# Patient Record
Sex: FEMALE | Race: WHITE | NOT HISPANIC OR LATINO | URBAN - METROPOLITAN AREA
[De-identification: names, ages, dates, MRNs, and addresses within clinical notes are randomized per-mention and may not be internally consistent; named-entity substitution may affect disease eponyms.]

---

## 2023-09-08 PROBLEM — E78.00 ELEVATED LDL CHOLESTEROL LEVEL: Status: ACTIVE | Noted: 2023-09-08

## 2023-09-08 PROBLEM — N95.8 GENITOURINARY SYNDROME OF MENOPAUSE: Status: ACTIVE | Noted: 2023-09-08

## 2023-09-08 PROBLEM — N81.10 FEMALE CYSTOCELE: Status: ACTIVE | Noted: 2023-09-08

## 2023-09-08 PROBLEM — I25.10 CORONARY ARTERY DISEASE INVOLVING NATIVE HEART WITHOUT ANGINA PECTORIS: Status: ACTIVE | Noted: 2023-09-08

## 2023-09-08 PROBLEM — E78.00 ELEVATED SERUM CHOLESTEROL: Status: ACTIVE | Noted: 2023-09-08

## 2023-09-08 PROBLEM — E55.9 VITAMIN D DEFICIENCY: Status: ACTIVE | Noted: 2023-09-08

## 2023-09-08 PROBLEM — R06.00 DYSPNEA: Status: ACTIVE | Noted: 2023-09-08

## 2023-09-08 PROBLEM — D75.839 THROMBOCYTHEMIA: Status: ACTIVE | Noted: 2023-09-08

## 2023-09-08 PROBLEM — R13.10 DYSPHAGIA: Status: ACTIVE | Noted: 2023-09-08

## 2023-09-08 RX ORDER — METOPROLOL TARTRATE 50 MG/1
1 TABLET ORAL 2 TIMES DAILY
COMMUNITY

## 2023-09-08 RX ORDER — ISOSORBIDE MONONITRATE 60 MG/1
1 TABLET, EXTENDED RELEASE ORAL EVERY MORNING
COMMUNITY

## 2023-09-08 RX ORDER — NITROGLYCERIN 0.4 MG/1
TABLET SUBLINGUAL
COMMUNITY

## 2023-09-08 RX ORDER — SERTRALINE HYDROCHLORIDE 100 MG/1
1 TABLET, FILM COATED ORAL DAILY
COMMUNITY
End: 2024-04-10 | Stop reason: SDUPTHER

## 2023-09-08 RX ORDER — HYDROXYZINE PAMOATE 100 MG/1
1 CAPSULE ORAL 3 TIMES DAILY PRN
COMMUNITY
End: 2024-02-07 | Stop reason: ALTCHOICE

## 2023-09-08 RX ORDER — TIOTROPIUM BROMIDE AND OLODATEROL 3.124; 2.736 UG/1; UG/1
2 SPRAY, METERED RESPIRATORY (INHALATION) DAILY
COMMUNITY
End: 2024-02-07 | Stop reason: ALTCHOICE

## 2023-09-08 RX ORDER — QUETIAPINE FUMARATE 300 MG/1
1 TABLET, FILM COATED ORAL NIGHTLY
COMMUNITY
End: 2024-04-09 | Stop reason: SDUPTHER

## 2023-09-08 RX ORDER — ALBUTEROL SULFATE 90 UG/1
1 AEROSOL, METERED RESPIRATORY (INHALATION) EVERY 4 HOURS PRN
COMMUNITY
Start: 2021-09-28 | End: 2024-02-07 | Stop reason: ALTCHOICE

## 2023-09-08 RX ORDER — ATORVASTATIN CALCIUM 80 MG/1
1 TABLET, FILM COATED ORAL DAILY
COMMUNITY

## 2023-09-08 RX ORDER — IBANDRONATE SODIUM 150 MG/1
1 TABLET, FILM COATED ORAL
COMMUNITY
Start: 2021-12-28 | End: 2024-02-07 | Stop reason: ALTCHOICE

## 2024-02-07 ENCOUNTER — HOSPITAL ENCOUNTER (OUTPATIENT)
Dept: RADIOLOGY | Facility: HOSPITAL | Age: 69
Discharge: HOME | End: 2024-02-07
Payer: MEDICARE

## 2024-02-07 ENCOUNTER — OFFICE VISIT (OUTPATIENT)
Dept: PRIMARY CARE | Facility: CLINIC | Age: 69
End: 2024-02-07
Payer: MEDICARE

## 2024-02-07 ENCOUNTER — HOSPITAL ENCOUNTER (OUTPATIENT)
Dept: RADIOLOGY | Facility: HOSPITAL | Age: 69
Discharge: HOME | End: 2024-02-07

## 2024-02-07 VITALS
HEART RATE: 73 BPM | DIASTOLIC BLOOD PRESSURE: 78 MMHG | HEIGHT: 62 IN | RESPIRATION RATE: 16 BRPM | OXYGEN SATURATION: 98 % | SYSTOLIC BLOOD PRESSURE: 118 MMHG | WEIGHT: 202.2 LBS | BODY MASS INDEX: 37.21 KG/M2

## 2024-02-07 DIAGNOSIS — G47.33 OSA (OBSTRUCTIVE SLEEP APNEA): ICD-10-CM

## 2024-02-07 DIAGNOSIS — G89.29 CHRONIC PAIN OF BOTH SHOULDERS: ICD-10-CM

## 2024-02-07 DIAGNOSIS — N32.81 OVERACTIVE BLADDER: ICD-10-CM

## 2024-02-07 DIAGNOSIS — M25.511 CHRONIC PAIN OF BOTH SHOULDERS: ICD-10-CM

## 2024-02-07 DIAGNOSIS — Z00.00 MEDICARE ANNUAL WELLNESS VISIT, SUBSEQUENT: ICD-10-CM

## 2024-02-07 DIAGNOSIS — Z13.1 SCREENING FOR DIABETES MELLITUS (DM): ICD-10-CM

## 2024-02-07 DIAGNOSIS — F51.01 PRIMARY INSOMNIA: ICD-10-CM

## 2024-02-07 DIAGNOSIS — Z13.29 SCREENING FOR THYROID DISORDER: ICD-10-CM

## 2024-02-07 DIAGNOSIS — E78.2 MIXED HYPERLIPIDEMIA: ICD-10-CM

## 2024-02-07 DIAGNOSIS — E55.9 VITAMIN D DEFICIENCY: ICD-10-CM

## 2024-02-07 DIAGNOSIS — M25.512 CHRONIC PAIN OF BOTH SHOULDERS: ICD-10-CM

## 2024-02-07 DIAGNOSIS — J45.20 MILD INTERMITTENT ASTHMA WITHOUT COMPLICATION (HHS-HCC): Primary | ICD-10-CM

## 2024-02-07 DIAGNOSIS — M81.0 AGE-RELATED OSTEOPOROSIS WITHOUT CURRENT PATHOLOGICAL FRACTURE: ICD-10-CM

## 2024-02-07 PROBLEM — J41.0 SIMPLE CHRONIC BRONCHITIS (MULTI): Status: ACTIVE | Noted: 2024-02-07

## 2024-02-07 PROCEDURE — 73030 X-RAY EXAM OF SHOULDER: CPT | Mod: RT

## 2024-02-07 PROCEDURE — 99215 OFFICE O/P EST HI 40 MIN: CPT

## 2024-02-07 PROCEDURE — 1126F AMNT PAIN NOTED NONE PRSNT: CPT

## 2024-02-07 PROCEDURE — G0439 PPPS, SUBSEQ VISIT: HCPCS

## 2024-02-07 PROCEDURE — 1170F FXNL STATUS ASSESSED: CPT

## 2024-02-07 PROCEDURE — 93005 ELECTROCARDIOGRAM TRACING: CPT

## 2024-02-07 PROCEDURE — 93010 ELECTROCARDIOGRAM REPORT: CPT

## 2024-02-07 PROCEDURE — 1036F TOBACCO NON-USER: CPT

## 2024-02-07 PROCEDURE — 1159F MED LIST DOCD IN RCRD: CPT

## 2024-02-07 PROCEDURE — 73030 X-RAY EXAM OF SHOULDER: CPT | Mod: LT

## 2024-02-07 ASSESSMENT — ACTIVITIES OF DAILY LIVING (ADL)
PILL BOX USED: NO
MANAGING FINANCES: INDEPENDENT
TOILETING: INDEPENDENT
USING TELEPHONE: INDEPENDENT
TAKING MEDICATION: INDEPENDENT
WALKS IN HOME: INDEPENDENT
NEEDS ASSISTANCE WITH FOOD: INDEPENDENT
STIL DRIVING: YES
JUDGMENT_ADEQUATE_SAFELY_COMPLETE_DAILY_ACTIVITIES: YES
GROCERY SHOPPING: INDEPENDENT
DRESSING YOURSELF: INDEPENDENT
PREPARING MEALS: INDEPENDENT
ADEQUATE_TO_COMPLETE_ADL: YES
USING TRANSPORTATION: INDEPENDENT
FEEDING YOURSELF: INDEPENDENT
HEARING - LEFT EAR: FUNCTIONAL
GROOMING: INDEPENDENT
DOING HOUSEWORK: INDEPENDENT
HEARING - RIGHT EAR: FUNCTIONAL
EATING: INDEPENDENT
BATHING: INDEPENDENT
PATIENT'S MEMORY ADEQUATE TO SAFELY COMPLETE DAILY ACTIVITIES?: YES

## 2024-02-07 ASSESSMENT — COLUMBIA-SUICIDE SEVERITY RATING SCALE - C-SSRS
6. HAVE YOU EVER DONE ANYTHING, STARTED TO DO ANYTHING, OR PREPARED TO DO ANYTHING TO END YOUR LIFE?: NO
2. HAVE YOU ACTUALLY HAD ANY THOUGHTS OF KILLING YOURSELF?: NO
1. IN THE PAST MONTH, HAVE YOU WISHED YOU WERE DEAD OR WISHED YOU COULD GO TO SLEEP AND NOT WAKE UP?: NO

## 2024-02-07 ASSESSMENT — ENCOUNTER SYMPTOMS
DEPRESSION: 0
LOSS OF SENSATION IN FEET: 0
OCCASIONAL FEELINGS OF UNSTEADINESS: 0

## 2024-02-07 ASSESSMENT — PATIENT HEALTH QUESTIONNAIRE - PHQ9
SUM OF ALL RESPONSES TO PHQ9 QUESTIONS 1 AND 2: 0
1. LITTLE INTEREST OR PLEASURE IN DOING THINGS: NOT AT ALL
2. FEELING DOWN, DEPRESSED OR HOPELESS: NOT AT ALL

## 2024-02-07 ASSESSMENT — PAIN SCALES - GENERAL: PAINLEVEL: 0-NO PAIN

## 2024-02-07 NOTE — PATIENT INSTRUCTIONS
I have ordered lab work for you to get done. This should be fasting. Nothing to eat or drink after midnight besides black tea,  black coffee, or water. If you do not hear from this office within two days of having your labs done, please call for your results.   I have ordered a bone density test to be done when you can.  We will call the results.  Decrease intake of saturated fats, fast food, sweets.  Increase intake of fresh fruit fresh vegetables and lean meats.  Increase healthy fats seeds, nuts, olive oil instead of butter.  walk 150 minutes/week for heart health.

## 2024-02-07 NOTE — PROGRESS NOTES
"Subjective   Patient ID: Marilou Albarado is a 68 y.o. female who presents for Annual Exam.    HPI   patient is here for annual physical former [facility] patient.  Denies any issues with chest pain, chest pressure, shortness of breath, constipation, diarrhea, blood in stool, blood in urine, muscle weakness in arms and legs, numbness and tingling in fingers or toes.  She does not monitor her blood pressure at home she does have bilateral shoulder pain that she has been had for over 2 years she takes Tylenol 2 tablets once a day but she continues to have increasing pain that she cannot tolerate anymore.  She does see Dr. Chirinos every 3 months she saw him 2 weeks ago.  She does have overactive bladder and experiences urinary urgency, frequency.  Review of Systems  Review of Systems negative except as noted in HPI and Chief complaint.    Current Outpatient Medications:     atorvastatin (Lipitor) 80 mg tablet, Take 1 tablet (80 mg) by mouth once daily., Disp: , Rfl:     isosorbide mononitrate ER (Imdur) 60 mg 24 hr tablet, Take 1 tablet (60 mg) by mouth once daily in the morning., Disp: , Rfl:     metoprolol tartrate (Lopressor) 50 mg tablet, Take 1 tablet by mouth 2 times a day., Disp: , Rfl:     nitroglycerin (Nitrostat) 0.4 mg SL tablet, as directed Sublingual prn, Disp: , Rfl:     QUEtiapine (SEROquel) 300 mg tablet, Take 1 tablet (300 mg) by mouth once daily at bedtime., Disp: , Rfl:     sertraline (Zoloft) 100 mg tablet, Take 1 tablet (100 mg) by mouth once daily., Disp: , Rfl:     Objective   /78 (BP Location: Left arm, Patient Position: Sitting, BP Cuff Size: Adult)   Pulse 73   Resp 16   Ht 1.575 m (5' 2\")   Wt 91.7 kg (202 lb 3.2 oz)   SpO2 98%   BMI 36.98 kg/m²     Physical Exam  Vitals reviewed.   Constitutional:       General: She is not in acute distress.     Appearance: Normal appearance.   HENT:      Head: Normocephalic.      Right Ear: Tympanic membrane normal.      Left Ear: Tympanic membrane normal. "      Nose: Nose normal.      Mouth/Throat:      Mouth: Mucous membranes are moist.      Pharynx: Oropharynx is clear.   Eyes:      Extraocular Movements: Extraocular movements intact.      Conjunctiva/sclera: Conjunctivae normal.      Pupils: Pupils are equal, round, and reactive to light.   Cardiovascular:      Rate and Rhythm: Normal rate.      Pulses: Normal pulses.      Heart sounds: Normal heart sounds.   Pulmonary:      Effort: Pulmonary effort is normal.      Breath sounds: Normal breath sounds.   Abdominal:      General: Abdomen is flat. Bowel sounds are normal.      Palpations: Abdomen is soft.   Musculoskeletal:         General: Normal range of motion.   Skin:     General: Skin is warm and dry.      Capillary Refill: Capillary refill takes 2 to 3 seconds.   Neurological:      General: No focal deficit present.      Mental Status: She is alert and oriented to person, place, and time. Mental status is at baseline.   Psychiatric:         Mood and Affect: Mood normal.         Behavior: Behavior is cooperative.     Assessment/Plan   Diagnoses and all orders for this visit:  Mild intermittent asthma without complication  Primary insomnia  Mixed hyperlipidemia  -     Comprehensive Metabolic Panel; Future  -     CBC and Auto Differential; Future  -     Lipid Panel; Future  -     ECG 12 lead (Clinic Performed)  Age-related osteoporosis without current pathological fracture  -     XR DEXA bone density; Future  Vitamin D deficiency  -     Vitamin D 25-Hydroxy,Total (for eval of Vitamin D levels); Future  Overactive bladder  ANIVAL (obstructive sleep apnea)  Medicare annual wellness visit, subsequent  Screening for thyroid disorder  -     TSH with reflex to Free T4 if abnormal; Future  Screening for diabetes mellitus (DM)  Chronic pain of both shoulders  -     XR shoulder left 2+ views; Future  -     XR shoulder right 2+ views; Future  Other orders  -     Follow Up In Primary Care - Health Maintenance; Future    I have  ordered lab work for you to get done. This should be fasting. Nothing to eat or drink after midnight besides black tea,  black coffee, or water. If you do not hear from this office within two days of having your labs done, please call for your results.   I have ordered a bone density test to be done when you can.  We will call the results.  Decrease intake of saturated fats, fast food, sweets.  Increase intake of fresh fruit fresh vegetables and lean meats.  Increase healthy fats seeds, nuts, olive oil instead of butter.  walk 150 minutes/week for heart health.  Advanced care planning was discussed with Marilou Albarado today. We reviewed code status, Medical Power of , and Living will. Pt does not have LW oe POA  *This note was dictated using DRAGON speech recognition software and was corrected for spelling or grammatical errors, but despite proofreading several typographical errors might be present that might affect the meaning of the content.*  Jenna Nieto, CNP

## 2024-02-27 ENCOUNTER — HOSPITAL ENCOUNTER (OUTPATIENT)
Dept: RADIOLOGY | Facility: HOSPITAL | Age: 69
Discharge: HOME | End: 2024-02-27
Payer: MEDICARE

## 2024-02-27 ENCOUNTER — APPOINTMENT (OUTPATIENT)
Dept: RADIOLOGY | Facility: HOSPITAL | Age: 69
End: 2024-02-27
Payer: MEDICARE

## 2024-02-27 DIAGNOSIS — R91.8 OTHER NONSPECIFIC ABNORMAL FINDING OF LUNG FIELD: ICD-10-CM

## 2024-02-27 PROCEDURE — 71250 CT THORAX DX C-: CPT

## 2024-03-13 ENCOUNTER — HOSPITAL ENCOUNTER (OUTPATIENT)
Dept: RADIOLOGY | Facility: HOSPITAL | Age: 69
Discharge: HOME | End: 2024-03-13
Payer: MEDICARE

## 2024-03-13 DIAGNOSIS — M81.0 AGE-RELATED OSTEOPOROSIS WITHOUT CURRENT PATHOLOGICAL FRACTURE: ICD-10-CM

## 2024-03-13 PROCEDURE — 77080 DXA BONE DENSITY AXIAL: CPT

## 2024-03-13 NOTE — LETTER
March 18, 2024     Marilou SARMAD Albarado  [address]  [facility] [address]      Dear Ms. Albarado:    Below are the results from your recent visit:    Resulted Orders   XR DEXA bone density    Narrative    Interpreted By:  Chica Zhang,   STUDY:  DEXA BONE DENSITY3/13/2024 10:16 am      INDICATION:  Signs/Symptoms:bi annual screening. The patient is a 69 y/o  year old  F. Postmenopausal denying hormonal replacement therapy      COMPARISON:  12/16/2021      ACCESSION NUMBER(S):  [id number]      ORDERING CLINICIAN:  CHERYL MENDES      TECHNIQUE:  DEXA BONE DENSITY      FINDINGS:  SPINE L1-L4  Bone Mineral Density: 0.816 g/cm2  T-Score -2.1  Z-Score -0.2  Bone Mineral Density change vs baseline:  -1.8 %  Bone Mineral Density change vs previous: -1.8 %      LEFT FEMUR -TOTAL  Bone Mineral Density: 0.86 g/cm2  T-Score -0.7   Z-Score  0.6  Bone Mineral Density change vs baseline: 10.6 %  Bone Mineral Density change vs previous: 10.6 %      LEFT FEMUR -NECK  Bone Mineral Density: 0.705 g/cm2  T-Score -1.3  Z-Score 0.3  Bone Mineral Density change vs baseline: 31.1 %  Bone Mineral Density change vs previous: 31.1 %          World Health Organization (WHO) criteria for post-menopausal,   Women:  Normal:         T-score at or above -1 SD  Osteopenia:   T-score between -1 and -2.5 SD  Osteoporosis: T-score at or below -2.5 SD          10-year Fracture Risk:  Major Osteoporotic Fracture  8.1 % without prior fracture and 13%  with prior fracture Hip Fracture                        0.8 % without  prior fracture and 1.2% with prior fracture      Note:  If no FRAX score is reported, it is because:  Some T-score for Spine Total or Hip Total or Femoral Neck at or below  -2.5      This exam was performed at [facility] on a [facility] Dexa Unit.        Impression    DEXA:  According to World Health Organization criteria,  classification is low bone mass (osteopenia)      Followup recommended in  two years or sooner as clinically warranted.      All images and detailed analysis are available on the  Radiology  PACS.      MACRO:  None      Signed by: Chica Zhang 3/15/2024 8:51 AM  Dictation workstation:   [id number]     Your Bone Density shows OSTEOPENIA (thinning of your bones).  This places you at higher risk of fracture.   Recommendations:   1 - Maintain adequate Calcium + Vitamin D intake.  Normally we recommend 500 - 600 mg of Calcium + Vitamin D twice daily.   2 - Continue or start regular weight bearing type activities.   3 - Avoid tobacco and alcohol use.   4 - Maintain healthy lifestyle to include well rounded diet.   5 - Repeat Bone Density in 2 years.     If you have any questions or concerns, please don't hesitate to call.         Sincerely,        Jenna Nieto, CNP

## 2024-03-22 ENCOUNTER — HOSPITAL ENCOUNTER (OUTPATIENT)
Dept: RADIOLOGY | Facility: HOSPITAL | Age: 69
Discharge: HOME | End: 2024-03-22
Payer: MEDICARE

## 2024-03-22 DIAGNOSIS — R93.89 ABNORMAL FINDINGS ON DIAGNOSTIC IMAGING OF OTHER SPECIFIED BODY STRUCTURES: ICD-10-CM

## 2024-03-22 PROCEDURE — 71250 CT THORAX DX C-: CPT | Performed by: RADIOLOGY

## 2024-03-22 PROCEDURE — 71250 CT THORAX DX C-: CPT

## 2024-04-09 DIAGNOSIS — J45.20 MILD INTERMITTENT ASTHMA WITHOUT COMPLICATION (HHS-HCC): Primary | ICD-10-CM

## 2024-04-09 DIAGNOSIS — F51.01 PRIMARY INSOMNIA: ICD-10-CM

## 2024-04-09 RX ORDER — QUETIAPINE FUMARATE 300 MG/1
300 TABLET, FILM COATED ORAL NIGHTLY
Qty: 90 TABLET | Refills: 1 | Status: SHIPPED | OUTPATIENT
Start: 2024-04-09 | End: 2024-10-06

## 2024-04-10 DIAGNOSIS — F51.01 PRIMARY INSOMNIA: ICD-10-CM

## 2024-04-10 DIAGNOSIS — F41.9 ANXIETY: Primary | ICD-10-CM

## 2024-04-10 DIAGNOSIS — Z12.31 ENCOUNTER FOR SCREENING MAMMOGRAM FOR BREAST CANCER: ICD-10-CM

## 2024-04-10 RX ORDER — SERTRALINE HYDROCHLORIDE 100 MG/1
100 TABLET, FILM COATED ORAL DAILY
Qty: 90 TABLET | Refills: 1 | Status: SHIPPED | OUTPATIENT
Start: 2024-04-10 | End: 2024-10-07

## 2024-04-10 RX ORDER — QUETIAPINE FUMARATE 300 MG/1
300 TABLET, FILM COATED ORAL NIGHTLY
Qty: 90 TABLET | Refills: 1 | Status: CANCELLED | OUTPATIENT
Start: 2024-04-10 | End: 2024-10-07

## 2024-04-29 ENCOUNTER — HOSPITAL ENCOUNTER (OUTPATIENT)
Dept: RADIOLOGY | Facility: HOSPITAL | Age: 69
Discharge: HOME | End: 2024-04-29
Payer: MEDICARE

## 2024-04-29 DIAGNOSIS — Z12.31 ENCOUNTER FOR SCREENING MAMMOGRAM FOR BREAST CANCER: ICD-10-CM

## 2024-04-29 PROCEDURE — 77067 SCR MAMMO BI INCL CAD: CPT | Performed by: RADIOLOGY

## 2024-04-29 PROCEDURE — 77067 SCR MAMMO BI INCL CAD: CPT

## 2024-04-29 PROCEDURE — 77063 BREAST TOMOSYNTHESIS BI: CPT | Performed by: RADIOLOGY

## 2024-05-10 ENCOUNTER — LAB (OUTPATIENT)
Dept: LAB | Facility: LAB | Age: 69
End: 2024-05-10
Payer: MEDICARE

## 2024-05-10 DIAGNOSIS — R73.01 ELEVATED FASTING GLUCOSE: Primary | ICD-10-CM

## 2024-05-10 DIAGNOSIS — E78.2 MIXED HYPERLIPIDEMIA: ICD-10-CM

## 2024-05-10 DIAGNOSIS — E55.9 VITAMIN D DEFICIENCY: ICD-10-CM

## 2024-05-10 DIAGNOSIS — Z13.29 SCREENING FOR THYROID DISORDER: ICD-10-CM

## 2024-05-10 LAB
25(OH)D3 SERPL-MCNC: 24 NG/ML (ref 31–100)
ALBUMIN SERPL-MCNC: 4.1 G/DL (ref 3.5–5)
ALP BLD-CCNC: 108 U/L (ref 35–125)
ALT SERPL-CCNC: 19 U/L (ref 5–40)
ANION GAP SERPL CALC-SCNC: 11 MMOL/L
AST SERPL-CCNC: 17 U/L (ref 5–40)
BASOPHILS # BLD AUTO: 0.05 X10*3/UL (ref 0–0.1)
BASOPHILS NFR BLD AUTO: 0.6 %
BILIRUB SERPL-MCNC: 0.2 MG/DL (ref 0.1–1.2)
BUN SERPL-MCNC: 15 MG/DL (ref 8–25)
CALCIUM SERPL-MCNC: 8.9 MG/DL (ref 8.5–10.4)
CHLORIDE SERPL-SCNC: 105 MMOL/L (ref 97–107)
CHOLEST SERPL-MCNC: 150 MG/DL (ref 133–200)
CHOLEST/HDLC SERPL: 3.5 {RATIO}
CO2 SERPL-SCNC: 24 MMOL/L (ref 24–31)
CREAT SERPL-MCNC: 0.9 MG/DL (ref 0.4–1.6)
EGFRCR SERPLBLD CKD-EPI 2021: 71 ML/MIN/1.73M*2
EOSINOPHIL # BLD AUTO: 0.19 X10*3/UL (ref 0–0.7)
EOSINOPHIL NFR BLD AUTO: 2.4 %
ERYTHROCYTE [DISTWIDTH] IN BLOOD BY AUTOMATED COUNT: 13.5 % (ref 11.5–14.5)
GLUCOSE SERPL-MCNC: 114 MG/DL (ref 65–99)
HCT VFR BLD AUTO: 39.1 % (ref 36–46)
HDLC SERPL-MCNC: 43 MG/DL
HGB BLD-MCNC: 12.2 G/DL (ref 12–16)
IMM GRANULOCYTES # BLD AUTO: 0.04 X10*3/UL (ref 0–0.7)
IMM GRANULOCYTES NFR BLD AUTO: 0.5 % (ref 0–0.9)
LDLC SERPL CALC-MCNC: 59 MG/DL (ref 65–130)
LYMPHOCYTES # BLD AUTO: 2.79 X10*3/UL (ref 1.2–4.8)
LYMPHOCYTES NFR BLD AUTO: 35.4 %
MCH RBC QN AUTO: 28.2 PG (ref 26–34)
MCHC RBC AUTO-ENTMCNC: 31.2 G/DL (ref 32–36)
MCV RBC AUTO: 91 FL (ref 80–100)
MONOCYTES # BLD AUTO: 0.7 X10*3/UL (ref 0.1–1)
MONOCYTES NFR BLD AUTO: 8.9 %
NEUTROPHILS # BLD AUTO: 4.11 X10*3/UL (ref 1.2–7.7)
NEUTROPHILS NFR BLD AUTO: 52.2 %
NRBC BLD-RTO: 0 /100 WBCS (ref 0–0)
PLATELET # BLD AUTO: 423 X10*3/UL (ref 150–450)
POTASSIUM SERPL-SCNC: 4.8 MMOL/L (ref 3.4–5.1)
PROT SERPL-MCNC: 6.8 G/DL (ref 5.9–7.9)
RBC # BLD AUTO: 4.32 X10*6/UL (ref 4–5.2)
SODIUM SERPL-SCNC: 140 MMOL/L (ref 133–145)
TRIGL SERPL-MCNC: 239 MG/DL (ref 40–150)
TSH SERPL DL<=0.05 MIU/L-ACNC: 1.14 MIU/L (ref 0.27–4.2)
WBC # BLD AUTO: 7.9 X10*3/UL (ref 4.4–11.3)

## 2024-05-10 PROCEDURE — 84443 ASSAY THYROID STIM HORMONE: CPT

## 2024-05-10 PROCEDURE — 80053 COMPREHEN METABOLIC PANEL: CPT

## 2024-05-10 PROCEDURE — 80061 LIPID PANEL: CPT

## 2024-05-10 PROCEDURE — 85025 COMPLETE CBC W/AUTO DIFF WBC: CPT

## 2024-05-10 PROCEDURE — 36415 COLL VENOUS BLD VENIPUNCTURE: CPT

## 2024-05-10 PROCEDURE — 82306 VITAMIN D 25 HYDROXY: CPT

## 2024-08-09 ENCOUNTER — OFFICE VISIT (OUTPATIENT)
Dept: PRIMARY CARE | Facility: CLINIC | Age: 69
End: 2024-08-09
Payer: MEDICARE

## 2024-08-09 VITALS
HEIGHT: 62 IN | DIASTOLIC BLOOD PRESSURE: 76 MMHG | OXYGEN SATURATION: 97 % | HEART RATE: 70 BPM | BODY MASS INDEX: 37.54 KG/M2 | WEIGHT: 204 LBS | RESPIRATION RATE: 16 BRPM | SYSTOLIC BLOOD PRESSURE: 123 MMHG

## 2024-08-09 DIAGNOSIS — J41.0 SIMPLE CHRONIC BRONCHITIS (MULTI): ICD-10-CM

## 2024-08-09 DIAGNOSIS — M62.838 MUSCLE SPASM: ICD-10-CM

## 2024-08-09 DIAGNOSIS — R73.01 ELEVATED FASTING GLUCOSE: ICD-10-CM

## 2024-08-09 DIAGNOSIS — F41.9 ANXIETY: ICD-10-CM

## 2024-08-09 DIAGNOSIS — F51.01 PRIMARY INSOMNIA: ICD-10-CM

## 2024-08-09 DIAGNOSIS — E78.2 MIXED HYPERLIPIDEMIA: Primary | ICD-10-CM

## 2024-08-09 DIAGNOSIS — I10 PRIMARY HYPERTENSION: ICD-10-CM

## 2024-08-09 PROCEDURE — 3008F BODY MASS INDEX DOCD: CPT

## 2024-08-09 PROCEDURE — 1124F ACP DISCUSS-NO DSCNMKR DOCD: CPT

## 2024-08-09 PROCEDURE — 99214 OFFICE O/P EST MOD 30 MIN: CPT

## 2024-08-09 PROCEDURE — 1159F MED LIST DOCD IN RCRD: CPT

## 2024-08-09 PROCEDURE — 1036F TOBACCO NON-USER: CPT

## 2024-08-09 PROCEDURE — 3074F SYST BP LT 130 MM HG: CPT

## 2024-08-09 PROCEDURE — 3078F DIAST BP <80 MM HG: CPT

## 2024-08-09 PROCEDURE — 1126F AMNT PAIN NOTED NONE PRSNT: CPT

## 2024-08-09 RX ORDER — QUETIAPINE FUMARATE 300 MG/1
300 TABLET, FILM COATED ORAL NIGHTLY
Qty: 90 TABLET | Refills: 1 | Status: SHIPPED | OUTPATIENT
Start: 2024-08-09 | End: 2025-02-05

## 2024-08-09 RX ORDER — METOPROLOL TARTRATE 50 MG/1
50 TABLET ORAL 2 TIMES DAILY
Qty: 180 TABLET | Refills: 1 | Status: SHIPPED | OUTPATIENT
Start: 2024-08-09

## 2024-08-09 RX ORDER — SERTRALINE HYDROCHLORIDE 100 MG/1
100 TABLET, FILM COATED ORAL DAILY
Qty: 90 TABLET | Refills: 1 | Status: SHIPPED | OUTPATIENT
Start: 2024-08-09 | End: 2025-02-05

## 2024-08-09 RX ORDER — ATORVASTATIN CALCIUM 80 MG/1
80 TABLET, FILM COATED ORAL DAILY
Qty: 90 TABLET | Refills: 1 | Status: SHIPPED | OUTPATIENT
Start: 2024-08-09 | End: 2025-02-05

## 2024-08-09 RX ORDER — IBANDRONATE SODIUM 150 MG/1
150 TABLET, FILM COATED ORAL
COMMUNITY

## 2024-08-09 ASSESSMENT — PATIENT HEALTH QUESTIONNAIRE - PHQ9
SUM OF ALL RESPONSES TO PHQ9 QUESTIONS 1 AND 2: 0
2. FEELING DOWN, DEPRESSED OR HOPELESS: NOT AT ALL
1. LITTLE INTEREST OR PLEASURE IN DOING THINGS: NOT AT ALL

## 2024-08-09 ASSESSMENT — ENCOUNTER SYMPTOMS
LOSS OF SENSATION IN FEET: 0
DEPRESSION: 0
OCCASIONAL FEELINGS OF UNSTEADINESS: 0

## 2024-08-09 ASSESSMENT — COLUMBIA-SUICIDE SEVERITY RATING SCALE - C-SSRS
2. HAVE YOU ACTUALLY HAD ANY THOUGHTS OF KILLING YOURSELF?: NO
1. IN THE PAST MONTH, HAVE YOU WISHED YOU WERE DEAD OR WISHED YOU COULD GO TO SLEEP AND NOT WAKE UP?: NO
6. HAVE YOU EVER DONE ANYTHING, STARTED TO DO ANYTHING, OR PREPARED TO DO ANYTHING TO END YOUR LIFE?: NO

## 2024-08-09 ASSESSMENT — PAIN SCALES - GENERAL: PAINLEVEL: 0-NO PAIN

## 2024-08-09 NOTE — PATIENT INSTRUCTIONS
Muscle spasm,   Referral placed to neuro  Xray cervical spine ordered. Will call with results once received.     HYPERLIPIDEMIA:  Decrease intake of saturated fats, fast food, sweets.  Increase intake of fresh fruit fresh vegetables and lean meats.  Increase healthy fats seeds, nuts, olive oil instead of butter.  walk 150 minutes/week for heart health.  Advanced care planning was discussed with Marilou Albarado today. We reviewed code status, Medical Power of , and Living will. Pt does not have  LW or POA.

## 2024-08-09 NOTE — PROGRESS NOTES
"Subjective   Patient ID: Marilou Albarado is a 68 y.o. female who presents for 6 Month follow up.    HPI   Patient denies any falls, urgent care, ER, hospitalization, new diagnoses, surgeries in the past year.  Denies any issues with chest pain, chest pressure, shortness of breath, constipation, diarrhea, blood in stool, urinary urgency, frequency, blood in urine, muscle weakness in arms and legs, numbness or tingling in fingers or toes.  Has complaints of muscle spasm, she believes, in her next x 2 months. Can be painful at times. Denies any falls or injuries to head or neck  Review of Systems  Review of Systems negative except as noted in HPI and Chief complaint.    Current Outpatient Medications:     ibandronate (Boniva) 150 mg tablet, Take 1 tablet (150 mg) by mouth every 30 (thirty) days. Take in morning with full glass of water on an empty stomach. No food, drink, meds, or lying down for 60 minutes after., Disp: , Rfl:     nitroglycerin (Nitrostat) 0.4 mg SL tablet, as directed Sublingual prn, Disp: , Rfl:     atorvastatin (Lipitor) 80 mg tablet, Take 1 tablet (80 mg) by mouth once daily., Disp: 90 tablet, Rfl: 1    metoprolol tartrate (Lopressor) 50 mg tablet, Take 1 tablet by mouth 2 times a day., Disp: 180 tablet, Rfl: 1    QUEtiapine (SEROquel) 300 mg tablet, Take 1 tablet (300 mg) by mouth once daily at bedtime., Disp: 90 tablet, Rfl: 1    sertraline (Zoloft) 100 mg tablet, Take 1 tablet (100 mg) by mouth once daily., Disp: 90 tablet, Rfl: 1    Objective   /76 (BP Location: Left arm, Patient Position: Sitting, BP Cuff Size: Adult)   Pulse 70   Resp 16   Ht 1.575 m (5' 2\")   Wt 92.5 kg (204 lb)   LMP  (LMP Unknown)   SpO2 97%   BMI 37.31 kg/m²     Physical Exam  Vitals reviewed.   Cardiovascular:      Rate and Rhythm: Normal rate.   Pulmonary:      Effort: Pulmonary effort is normal.   Musculoskeletal:         General: Normal range of motion.      Cervical back: Normal range of motion. "   Neurological:      General: No focal deficit present.      Mental Status: She is alert.   Psychiatric:         Mood and Affect: Mood normal.       Assessment/Plan   Diagnoses and all orders for this visit:  Mixed hyperlipidemia  -     atorvastatin (Lipitor) 80 mg tablet; Take 1 tablet (80 mg) by mouth once daily.  Elevated fasting glucose  Anxiety  -     sertraline (Zoloft) 100 mg tablet; Take 1 tablet (100 mg) by mouth once daily.  Primary insomnia  -     QUEtiapine (SEROquel) 300 mg tablet; Take 1 tablet (300 mg) by mouth once daily at bedtime.  Primary hypertension  -     metoprolol tartrate (Lopressor) 50 mg tablet; Take 1 tablet by mouth 2 times a day.  Simple chronic bronchitis (Multi)  -     Disability Placard  Muscle spasm  -     Referral to Neurology; Future  -     XR cervical spine 2-3 views; Future  Other orders  -     Follow Up In Primary Care - Health Maintenance  Muscle spasm,   Referral placed to neuro  Xray cervical spine ordered. Will call with results once received.     HYPERLIPIDEMIA:  Decrease intake of saturated fats, fast food, sweets.  Increase intake of fresh fruit fresh vegetables and lean meats.  Increase healthy fats seeds, nuts, olive oil instead of butter.  walk 150 minutes/week for heart health.  Advanced care planning was discussed with Marilou Albarado today. We reviewed code status, Medical Power of , and Living will. Pt does not have  LW or POA.     *This note was dictated using DRAGON speech recognition software and was corrected for spelling or grammatical errors, but despite proofreading several typographical errors might be present that might affect the meaning of the content.*  Jenna Nieto, CNP

## 2024-08-14 ENCOUNTER — HOSPITAL ENCOUNTER (OUTPATIENT)
Dept: RADIOLOGY | Facility: HOSPITAL | Age: 69
Discharge: HOME | End: 2024-08-14
Payer: MEDICARE

## 2024-08-14 DIAGNOSIS — R91.8 OTHER NONSPECIFIC ABNORMAL FINDING OF LUNG FIELD: ICD-10-CM

## 2024-08-14 PROCEDURE — 71250 CT THORAX DX C-: CPT

## 2024-08-19 ENCOUNTER — TELEPHONE (OUTPATIENT)
Dept: PRIMARY CARE | Facility: CLINIC | Age: 69
End: 2024-08-19
Payer: MEDICARE

## 2024-08-19 DIAGNOSIS — I10 PRIMARY HYPERTENSION: ICD-10-CM

## 2024-08-19 RX ORDER — METOPROLOL SUCCINATE 50 MG/1
1 TABLET, EXTENDED RELEASE ORAL
COMMUNITY
Start: 2024-06-07 | End: 2024-08-22 | Stop reason: SDUPTHER

## 2024-08-19 RX ORDER — METOPROLOL SUCCINATE 50 MG/1
50 TABLET, EXTENDED RELEASE ORAL
Qty: 90 TABLET | Refills: 0 | OUTPATIENT
Start: 2024-08-19 | End: 2024-11-17

## 2024-08-19 RX ORDER — METOPROLOL TARTRATE 50 MG/1
50 TABLET ORAL 2 TIMES DAILY
Qty: 180 TABLET | Refills: 1 | Status: SHIPPED | OUTPATIENT
Start: 2024-08-19 | End: 2024-08-22 | Stop reason: WASHOUT

## 2024-08-19 NOTE — TELEPHONE ENCOUNTER
Patient's pharmacy called and she told the pharmacy that her cardiologist used to prescribe the metoprolol to her but he retired. She is on metoprolol succinate ER 50 mg twice a day. Can you send in new Rx?

## 2024-08-19 NOTE — TELEPHONE ENCOUNTER
Patient called. It looks like you sent in Metoprolol (Lopressor) 50 mg once a day. She takes Metoprolol Succinate ER 50 mg once a day. Please discontinue metoprolol tartrate (Lopressor) 50 mg 2 times a day. I pulled the correct medication over from the outside medication tab. See Rx tab. Correct pharmacy selected.

## 2024-08-21 ENCOUNTER — TELEPHONE (OUTPATIENT)
Dept: CARDIOLOGY | Facility: CLINIC | Age: 69
End: 2024-08-21
Payer: MEDICARE

## 2024-08-22 DIAGNOSIS — I10 PRIMARY HYPERTENSION: ICD-10-CM

## 2024-08-22 RX ORDER — METOPROLOL SUCCINATE 50 MG/1
50 TABLET, EXTENDED RELEASE ORAL
Qty: 90 TABLET | Refills: 1 | Status: SHIPPED | OUTPATIENT
Start: 2024-08-22

## 2024-09-05 ENCOUNTER — APPOINTMENT (OUTPATIENT)
Dept: CARDIOLOGY | Facility: CLINIC | Age: 69
End: 2024-09-05
Payer: MEDICARE

## 2024-09-26 ENCOUNTER — OFFICE VISIT (OUTPATIENT)
Dept: CARDIOLOGY | Facility: CLINIC | Age: 69
End: 2024-09-26
Payer: MEDICARE

## 2024-09-26 VITALS
SYSTOLIC BLOOD PRESSURE: 131 MMHG | BODY MASS INDEX: 37.54 KG/M2 | WEIGHT: 204 LBS | HEART RATE: 67 BPM | TEMPERATURE: 98 F | DIASTOLIC BLOOD PRESSURE: 67 MMHG | OXYGEN SATURATION: 96 % | HEIGHT: 62 IN | RESPIRATION RATE: 16 BRPM

## 2024-09-26 DIAGNOSIS — E66.9 CLASS II OBESITY: ICD-10-CM

## 2024-09-26 DIAGNOSIS — I10 PRIMARY HYPERTENSION: ICD-10-CM

## 2024-09-26 DIAGNOSIS — G47.33 OSA (OBSTRUCTIVE SLEEP APNEA): ICD-10-CM

## 2024-09-26 DIAGNOSIS — I25.10 CORONARY ARTERY DISEASE INVOLVING NATIVE HEART WITHOUT ANGINA PECTORIS, UNSPECIFIED VESSEL OR LESION TYPE: Primary | ICD-10-CM

## 2024-09-26 DIAGNOSIS — E78.2 MIXED HYPERLIPIDEMIA: ICD-10-CM

## 2024-09-26 DIAGNOSIS — R06.09 DYSPNEA ON EXERTION: ICD-10-CM

## 2024-09-26 PROBLEM — E66.812 CLASS II OBESITY: Status: ACTIVE | Noted: 2024-09-26

## 2024-09-26 PROCEDURE — 3078F DIAST BP <80 MM HG: CPT | Performed by: INTERNAL MEDICINE

## 2024-09-26 PROCEDURE — 3075F SYST BP GE 130 - 139MM HG: CPT | Performed by: INTERNAL MEDICINE

## 2024-09-26 PROCEDURE — 1160F RVW MEDS BY RX/DR IN RCRD: CPT | Performed by: INTERNAL MEDICINE

## 2024-09-26 PROCEDURE — 1159F MED LIST DOCD IN RCRD: CPT | Performed by: INTERNAL MEDICINE

## 2024-09-26 PROCEDURE — 3008F BODY MASS INDEX DOCD: CPT | Performed by: INTERNAL MEDICINE

## 2024-09-26 PROCEDURE — 1126F AMNT PAIN NOTED NONE PRSNT: CPT | Performed by: INTERNAL MEDICINE

## 2024-09-26 PROCEDURE — 99204 OFFICE O/P NEW MOD 45 MIN: CPT | Performed by: INTERNAL MEDICINE

## 2024-09-26 PROCEDURE — 1036F TOBACCO NON-USER: CPT | Performed by: INTERNAL MEDICINE

## 2024-09-26 PROCEDURE — 99214 OFFICE O/P EST MOD 30 MIN: CPT | Performed by: INTERNAL MEDICINE

## 2024-09-26 RX ORDER — METOPROLOL SUCCINATE 50 MG/1
50 TABLET, EXTENDED RELEASE ORAL
Qty: 90 TABLET | Refills: 3 | Status: SHIPPED | OUTPATIENT
Start: 2024-09-26 | End: 2025-09-26

## 2024-09-26 RX ORDER — ATORVASTATIN CALCIUM 80 MG/1
80 TABLET, FILM COATED ORAL DAILY
Qty: 90 TABLET | Refills: 3 | Status: SHIPPED | OUTPATIENT
Start: 2024-09-26 | End: 2025-09-26

## 2024-09-26 RX ORDER — AZITHROMYCIN 500 MG/1
500 TABLET, FILM COATED ORAL DAILY
Qty: 5 TABLET | Refills: 0 | Status: SHIPPED | OUTPATIENT
Start: 2024-09-26 | End: 2024-10-01

## 2024-09-26 ASSESSMENT — LIFESTYLE VARIABLES
HAS A RELATIVE, FRIEND, DOCTOR, OR ANOTHER HEALTH PROFESSIONAL EXPRESSED CONCERN ABOUT YOUR DRINKING OR SUGGESTED YOU CUT DOWN: NO
HOW OFTEN DO YOU HAVE SIX OR MORE DRINKS ON ONE OCCASION: NEVER
AUDIT TOTAL SCORE: 0
HOW OFTEN DO YOU HAVE A DRINK CONTAINING ALCOHOL: NEVER
HOW MANY STANDARD DRINKS CONTAINING ALCOHOL DO YOU HAVE ON A TYPICAL DAY: PATIENT DOES NOT DRINK
AUDIT-C TOTAL SCORE: 0
HAVE YOU OR SOMEONE ELSE BEEN INJURED AS A RESULT OF YOUR DRINKING: NO
SKIP TO QUESTIONS 9-10: 1

## 2024-09-26 ASSESSMENT — ENCOUNTER SYMPTOMS
DEPRESSION: 0
LOSS OF SENSATION IN FEET: 0
OCCASIONAL FEELINGS OF UNSTEADINESS: 0

## 2024-09-26 ASSESSMENT — PATIENT HEALTH QUESTIONNAIRE - PHQ9
1. LITTLE INTEREST OR PLEASURE IN DOING THINGS: NOT AT ALL
SUM OF ALL RESPONSES TO PHQ9 QUESTIONS 1 AND 2: 0
2. FEELING DOWN, DEPRESSED OR HOPELESS: NOT AT ALL

## 2024-09-26 ASSESSMENT — PAIN SCALES - GENERAL: PAINLEVEL: 0-NO PAIN

## 2024-09-26 NOTE — PROGRESS NOTES
Subjective   Chief Complaint   Patient presents with    Follow-up     Mrs\Ms. Albarado is present for her 6 month Follow up with Dr. Sanchez. Pt is a former [facility] pt       68-year patient with a history of COPD, currently on multiple bronchodilators.  Other history significant for COPD with asthma.  Lung nodule.  History of smoking in past.  Also sleep apnea.  Has seen pulmonary eval in the past.  Patient has not seen recently nurse practitioner primary care physician with further evaluation patient currently on Lipitor, Lopressor as well as nitro tablet as needed.  Patient had a 3 months ago lipid profile done total cholesterol 150, LDL is 59 with a triglyceride is about to 39.  BMP is unremarkable as well as CBC is unremarkable.  Electrocardiogram was done about 6 months ago essentially underlying sinus rhythm low voltage QRS rate 71 with a nonspecific ST-T changes seen.  Patient had echocardiogram done about 2 years ago essentially showed normal ejection fraction with diastolic dysfunction, otherwise unremarkable study. Patient is following Dr. Boucher.  Currently on 3 L nasal cannula oxygen.  Had a negative nuclear stress test for ischemia last year which was unremarkable.  Now here for routine evaluation.  Patient, bronchitis symptoms.  Wants some Z-Andreas antibiotic.  History reviewed. No pertinent past medical history.  History reviewed. No pertinent surgical history.  Family medical history includes dementia in father.  Current Outpatient Medications   Medication Sig Dispense Refill    atorvastatin (Lipitor) 80 mg tablet Take 1 tablet (80 mg) by mouth once daily. 90 tablet 1    ibandronate (Boniva) 150 mg tablet Take 1 tablet (150 mg) by mouth every 30 (thirty) days. Take in morning with full glass of water on an empty stomach. No food, drink, meds, or lying down for 60 minutes after.      metoprolol succinate XL (Toprol-XL) 50 mg 24 hr tablet Take 1 tablet (50 mg) by mouth early in the morning.. 90 tablet 1     "nitroglycerin (Nitrostat) 0.4 mg SL tablet as directed Sublingual prn      QUEtiapine (SEROquel) 300 mg tablet Take 1 tablet (300 mg) by mouth once daily at bedtime. 90 tablet 1    sertraline (Zoloft) 100 mg tablet Take 1 tablet (100 mg) by mouth once daily. 90 tablet 1     No current facility-administered medications for this visit.      reports that she quit smoking about 3 years ago. Her smoking use included cigarettes. She has never been exposed to tobacco smoke. She has never used smokeless tobacco. She reports that she does not drink alcohol and does not use drugs.  Allergies:  Penicillin    ROS: See HPI  CONSTITUTIONAL: Chills- none. Fever- none. Weight change appropriate for age.  HEENT: Headache- Negative.  Change in vision- none.  Ear pain- none. Nasal congestion- none. Post-nasal drip-none.  Sore throat-none.  CARDIOLOGY: Chest pain- none.  Leg edema-trace.  Murmurs-soft systolic.  Palpitation- none.  RESPIRATORY: Complain on and off shortness of breath.  Currently on a 3 L nasal cannula oxygen.  GI: Abdominal pain- none.  Change in bowel habits- none.  Constipation- none.  Diarrhea- none.  Nausea- none.  Vomiting- none.  MUSCULOSKELETAL: Joint pain- none.  Muscle aches- none.  DERMATOLOGY: Rash- none.  NEUROLOGY: Dizziness- none.   Headache- none.  PSYCHIATRY: Denies any depression or anxiety     Vitals:    09/26/24 0956   BP: 131/67   Pulse: 67   Resp: 16   Temp: 36.7 °C (98 °F)   TempSrc: Core   SpO2: 96%   Weight: 92.5 kg (204 lb)   Height: 1.575 m (5' 2\")   PainSc: 0-No pain      BMI:Body mass index is 37.31 kg/m².   General Cardiology:  General Appearance: Alert, oriented and in no acute distress.  HEENT: extra ocular movements intact (EOMI), pupils equal,  round, reactive to light and accommodation (PERRLA).  Carotid Upstroke: no bruit, normal.  Jugular Venous Distention (JVD): flat.  Chest: normal.  Lungs: Diffuse rhonchi's  Heart Sounds: no S3 or S4, normal S1, S2, regular rate.  Murmur, Click, " Gallop: no systolic murmur.  Abdomen: no hepatomegaly, no masses felt, soft.  Extremities: no leg edema.  Peripheral pulses: 2 plus bilateral.  NEUROLOGY Cranial nerves II-XII grossly intact.     Patient Active Problem List   Diagnosis    Coronary artery disease involving native heart without angina pectoris    Dysphagia    Dyspnea    Elevated LDL cholesterol level    Female cystocele    Genitourinary syndrome of menopause    Elevated serum cholesterol    Thrombocythemia    Vitamin D deficiency    Mild intermittent asthma without complication (HHS-HCC)    Primary insomnia    Overactive bladder    Mixed hyperlipidemia    ANIVAL (obstructive sleep apnea)    Age-related osteoporosis without current pathological fracture    Simple chronic bronchitis (Multi)    Elevated fasting glucose    Primary hypertension    Class II obesity       Problem List Items Addressed This Visit       Coronary artery disease involving native heart without angina pectoris - Primary    Dyspnea    Mixed hyperlipidemia    ANIVAL (obstructive sleep apnea)    Primary hypertension    Class II obesity      69-year-old female patient with only history of obesity, hypertension hyperlipidemia.  1.  COPD/short of breath: Patient currently on 3 L nasal current oxygen.  Patient complain of bronchitis symptoms once antibiotic we will start patients on a Zithromax 5 mg tablet p.o. daily for 5 days.  Advised patient to follow-up with the primary care physician and pulmonologist for further treatment plans and goals.  2.  Hypertension: Continue current Toprol-XL 50 mg tablet p.o. daily.  3.  Hyperlipidemia: Continue current Lipitor 80 mg tablet p.o. daily.  4.  Obesity: Weight loss advised.    Advised patient to avoid lunch meats, canned soups, pizzas, bread rolls, and sandwiches. Advised patient to limit salt intake 1,500 mg daily. Advised patient to exercise 30 mins/3 times a week including treadmill or aerobic type, Goal to achieve 65% target HR.    Diet and  exercise reviewed with patient..advice to walk about 10,000 steps or about 2 hours during day time. Cut back on salt, sugar and flour.    Pt. care time is spent includes review of diagnostic tests, labs, radiographs, EKGs, old echoes, cardiac work-up and coordination of care. Assessment, impression and plans are reflected in the note above as well as the orders.    Bishop Sanchez MD

## 2024-10-07 DIAGNOSIS — M81.0 AGE-RELATED OSTEOPOROSIS WITHOUT CURRENT PATHOLOGICAL FRACTURE: Primary | ICD-10-CM

## 2024-10-08 RX ORDER — IBANDRONATE SODIUM 150 MG/1
150 TABLET, FILM COATED ORAL
Qty: 3 TABLET | Refills: 3 | Status: SHIPPED | OUTPATIENT
Start: 2024-10-08 | End: 2025-10-08

## 2025-01-21 ENCOUNTER — APPOINTMENT (OUTPATIENT)
Dept: NEUROLOGY | Facility: CLINIC | Age: 70
End: 2025-01-21
Payer: MEDICARE

## 2025-02-13 ENCOUNTER — OFFICE VISIT (OUTPATIENT)
Dept: PRIMARY CARE | Facility: CLINIC | Age: 70
End: 2025-02-13
Payer: MEDICARE

## 2025-02-13 VITALS
WEIGHT: 210.8 LBS | SYSTOLIC BLOOD PRESSURE: 122 MMHG | HEIGHT: 62 IN | HEART RATE: 75 BPM | OXYGEN SATURATION: 95 % | DIASTOLIC BLOOD PRESSURE: 78 MMHG | RESPIRATION RATE: 16 BRPM | BODY MASS INDEX: 38.79 KG/M2

## 2025-02-13 DIAGNOSIS — E55.9 VITAMIN D DEFICIENCY: ICD-10-CM

## 2025-02-13 DIAGNOSIS — Z12.31 ENCOUNTER FOR SCREENING MAMMOGRAM FOR MALIGNANT NEOPLASM OF BREAST: Primary | ICD-10-CM

## 2025-02-13 DIAGNOSIS — F41.9 ANXIETY: ICD-10-CM

## 2025-02-13 DIAGNOSIS — Z13.29 SCREENING FOR THYROID DISORDER: ICD-10-CM

## 2025-02-13 DIAGNOSIS — E66.01 OBESITY, MORBID (MULTI): ICD-10-CM

## 2025-02-13 DIAGNOSIS — E78.2 MIXED HYPERLIPIDEMIA: ICD-10-CM

## 2025-02-13 DIAGNOSIS — F51.01 PRIMARY INSOMNIA: ICD-10-CM

## 2025-02-13 DIAGNOSIS — D75.839 THROMBOCYTHEMIA: ICD-10-CM

## 2025-02-13 DIAGNOSIS — I10 PRIMARY HYPERTENSION: ICD-10-CM

## 2025-02-13 DIAGNOSIS — J41.0 SIMPLE CHRONIC BRONCHITIS (MULTI): ICD-10-CM

## 2025-02-13 DIAGNOSIS — R73.01 ELEVATED FASTING GLUCOSE: ICD-10-CM

## 2025-02-13 DIAGNOSIS — Z00.00 MEDICARE ANNUAL WELLNESS VISIT, SUBSEQUENT: ICD-10-CM

## 2025-02-13 DIAGNOSIS — M81.0 AGE-RELATED OSTEOPOROSIS WITHOUT CURRENT PATHOLOGICAL FRACTURE: ICD-10-CM

## 2025-02-13 DIAGNOSIS — Z12.11 ENCOUNTER FOR SCREENING FOR MALIGNANT NEOPLASM OF COLON: ICD-10-CM

## 2025-02-13 PROCEDURE — 1126F AMNT PAIN NOTED NONE PRSNT: CPT

## 2025-02-13 PROCEDURE — 3074F SYST BP LT 130 MM HG: CPT

## 2025-02-13 PROCEDURE — 3008F BODY MASS INDEX DOCD: CPT

## 2025-02-13 PROCEDURE — 3078F DIAST BP <80 MM HG: CPT

## 2025-02-13 PROCEDURE — 99215 OFFICE O/P EST HI 40 MIN: CPT

## 2025-02-13 PROCEDURE — 1159F MED LIST DOCD IN RCRD: CPT

## 2025-02-13 PROCEDURE — G0439 PPPS, SUBSEQ VISIT: HCPCS

## 2025-02-13 PROCEDURE — 1036F TOBACCO NON-USER: CPT

## 2025-02-13 PROCEDURE — 1124F ACP DISCUSS-NO DSCNMKR DOCD: CPT

## 2025-02-13 PROCEDURE — 1170F FXNL STATUS ASSESSED: CPT

## 2025-02-13 RX ORDER — IBANDRONATE SODIUM 150 MG/1
150 TABLET, FILM COATED ORAL
Qty: 3 TABLET | Refills: 3 | Status: SHIPPED | OUTPATIENT
Start: 2025-02-13 | End: 2026-02-13

## 2025-02-13 RX ORDER — ATORVASTATIN CALCIUM 80 MG/1
80 TABLET, FILM COATED ORAL DAILY
Qty: 90 TABLET | Refills: 1 | Status: SHIPPED | OUTPATIENT
Start: 2025-02-13 | End: 2025-08-12

## 2025-02-13 RX ORDER — QUETIAPINE FUMARATE 300 MG/1
300 TABLET, FILM COATED ORAL NIGHTLY
Qty: 90 TABLET | Refills: 1 | Status: SHIPPED | OUTPATIENT
Start: 2025-02-13 | End: 2025-08-12

## 2025-02-13 RX ORDER — SERTRALINE HYDROCHLORIDE 100 MG/1
100 TABLET, FILM COATED ORAL DAILY
Qty: 90 TABLET | Refills: 1 | Status: SHIPPED | OUTPATIENT
Start: 2025-02-13 | End: 2025-08-12

## 2025-02-13 RX ORDER — METOPROLOL SUCCINATE 50 MG/1
50 TABLET, EXTENDED RELEASE ORAL
Qty: 90 TABLET | Refills: 1 | Status: SHIPPED | OUTPATIENT
Start: 2025-02-13 | End: 2025-08-12

## 2025-02-13 ASSESSMENT — ACTIVITIES OF DAILY LIVING (ADL)
HEARING - RIGHT EAR: FUNCTIONAL
TOILETING: INDEPENDENT
BATHING: INDEPENDENT
TAKING MEDICATION: INDEPENDENT
PREPARING MEALS: INDEPENDENT
EATING: INDEPENDENT
DOING HOUSEWORK: INDEPENDENT
DRESSING YOURSELF: INDEPENDENT
PILL BOX USED: NO
DRESSING: INDEPENDENT
JUDGMENT_ADEQUATE_SAFELY_COMPLETE_DAILY_ACTIVITIES: YES
FEEDING YOURSELF: INDEPENDENT
MANAGING FINANCES: INDEPENDENT
PATIENT'S MEMORY ADEQUATE TO SAFELY COMPLETE DAILY ACTIVITIES?: YES
STIL DRIVING: YES
FEEDING: INDEPENDENT
ADEQUATE_TO_COMPLETE_ADL: YES
NEEDS ASSISTANCE WITH FOOD: INDEPENDENT
GROCERY SHOPPING: INDEPENDENT
JUDGMENT_ADEQUATE_SAFELY_COMPLETE_DAILY_ACTIVITIES: YES
GROOMING: INDEPENDENT
ADEQUATE_TO_COMPLETE_ADL: YES
BATHING: INDEPENDENT
WALKS IN HOME: INDEPENDENT
HEARING - LEFT EAR: FUNCTIONAL
USING TELEPHONE: INDEPENDENT
TOILETING: INDEPENDENT
USING TRANSPORTATION: INDEPENDENT

## 2025-02-13 ASSESSMENT — PATIENT HEALTH QUESTIONNAIRE - PHQ9
1. LITTLE INTEREST OR PLEASURE IN DOING THINGS: NOT AT ALL
2. FEELING DOWN, DEPRESSED OR HOPELESS: NOT AT ALL
SUM OF ALL RESPONSES TO PHQ9 QUESTIONS 1 AND 2: 0

## 2025-02-13 ASSESSMENT — ENCOUNTER SYMPTOMS
DEPRESSION: 0
OCCASIONAL FEELINGS OF UNSTEADINESS: 0
LOSS OF SENSATION IN FEET: 0

## 2025-02-13 ASSESSMENT — PAIN SCALES - GENERAL: PAINLEVEL_OUTOF10: 0-NO PAIN

## 2025-02-13 ASSESSMENT — COLUMBIA-SUICIDE SEVERITY RATING SCALE - C-SSRS
1. IN THE PAST MONTH, HAVE YOU WISHED YOU WERE DEAD OR WISHED YOU COULD GO TO SLEEP AND NOT WAKE UP?: NO
2. HAVE YOU ACTUALLY HAD ANY THOUGHTS OF KILLING YOURSELF?: NO
6. HAVE YOU EVER DONE ANYTHING, STARTED TO DO ANYTHING, OR PREPARED TO DO ANYTHING TO END YOUR LIFE?: NO

## 2025-02-13 NOTE — ASSESSMENT & PLAN NOTE
Orders:    QUEtiapine (SEROquel) 300 mg tablet; Take 1 tablet (300 mg) by mouth once daily at bedtime.

## 2025-02-13 NOTE — PATIENT INSTRUCTIONS
Mixed hyperlipidemia    HYPERLIPIDEMIA:  Your cholesterol level is elevated. Decrease intake of saturated fats, fast food, sweets.  Increase intake of fresh fruit fresh vegetables and lean meats.  Increase healthy fats seeds, nuts, olive oil instead of butter.  walk 150 minutes/week for heart health.   Aim for 25-30 grams of fiber in your diet daily.  May consider adding Fish Oil supplement 1,200 mg per day or Omega 3 Supplement daily.      Primary hypertension    HYPERTENSION:   Decrease intake of processed foods, fast foods, lunch meat, canned soups, canned veggies.  Increase intake of fresh fruits, veggies, and lean meats. Monitor blood pressure at home, keep a log and bring this with you to your next appointment. Call the office if your blood pressure runs 150/90 or higher consistently.    Blood Pressure Technique:  Sit quietly in a chair for 5 minutes with back supported and feet on the floor  Then place left arm on a table or armrest so bicep area is at the same level of heart or left breast  Check three times in a row, disregard the highest reading and average the other two    Primary insomnia    Orders:    QUEtiapine (SEROquel) 300 mg tablet; Take 1 tablet (300 mg) by mouth once daily at bedtime.    Anxiety    ANXIETY:   Stress/Anxiety reduction interventions:    -Relaxation techniques- deep breathing, meditation, acupuncture, guided imagery, yoga  -3-3-3 sit down in a quiet room. Look around the room and name 3 items you see. Then close your eyes and listen and quietly name 3 items you hear around you. Lastly, move 3 different body parts in a [place] 10 times each  -Avoid caffeine, alcohol, illicit drug use  -Get enough sleep, 7-9 hours per night  -Eat a healthy diet, prioritizing lean proteins, fruits/vegetables, and whole grains  -Purposeful movement daily- walking, biking, strength training  -Speaking with a counselor can be very helpful.    Age-related osteoporosis without current pathological  fracture    Dexa UTD  Continue Boniva  Encounter for screening mammogram for malignant neoplasm of breast    Orders:    BI mammo bilateral screening tomosynthesis; Future  MAMMOGRAM For Breast Cancer Screening:  I have ordered you a mammogram to be done as soon as you are able.  We will call the results.    Vitamin D deficiency    Orders:    Vitamin D 25-Hydroxy,Total (for eval of Vitamin D levels); Future  LAB Order/ BLOOD TESTS   I have ordered lab work for you to get done. This should be fasting. Nothing to eat or drink after midnight besides black tea,  black coffee, or water. If you do not hear from this office within two days of having your labs done, please call for your results.     Elevated fasting glucose    Orders:    Hemoglobin A1C; Future  LAB Order/ BLOOD TESTS   I have ordered lab work for you to get done. This should be fasting. Nothing to eat or drink after midnight besides black tea,  black coffee, or water. If you do not hear from this office within two days of having your labs done, please call for your results.     Thrombocythemia    LAB Order/ BLOOD TESTS   I have ordered lab work for you to get done. This should be fasting. Nothing to eat or drink after midnight besides black tea,  black coffee, or water. If you do not hear from this office within two days of having your labs done, please call for your results.     Screening for thyroid disorder    Orders:    TSH with reflex to Free T4 if abnormal; Future  LAB Order/ BLOOD TESTS   I have ordered lab work for you to get done. This should be fasting. Nothing to eat or drink after midnight besides black tea,  black coffee, or water. If you do not hear from this office within two days of having your labs done, please call for your results.     Medicare annual wellness visit, subsequent    Orders:    Comprehensive Metabolic Panel; Future    CBC and Auto Differential; Future    Body mass index (BMI) 38.0-38.9, adult    Orders:    CBC and Auto  Differential; Future    Obesity, morbid (Multi)    monitor caloric intake by keeping a daily log of all food you are eating or drinking.  My fitness pal is a great application you can use to monitor calorie intake.  Increase activity walking 150 minutes/week is highly recommended.  Joining the local [employer] or gym if feasible also recommended.    Simple chronic bronchitis (Multi)  Continue follow up with pulm as scheduled.

## 2025-02-13 NOTE — ASSESSMENT & PLAN NOTE
HYPERTENSION:   Decrease intake of processed foods, fast foods, lunch meat, canned soups, canned veggies.  Increase intake of fresh fruits, veggies, and lean meats. Monitor blood pressure at home, keep a log and bring this with you to your next appointment. Call the office if your blood pressure runs 150/90 or higher consistently.    Blood Pressure Technique:  Sit quietly in a chair for 5 minutes with back supported and feet on the floor  Then place left arm on a table or armrest so bicep area is at the same level of heart or left breast  Check three times in a row, disregard the highest reading and average the other two

## 2025-02-13 NOTE — PROGRESS NOTES
"  Subjective   Reason for Visit: Marilou Albarado is an 69 y.o. female here for a Medicare Wellness visit.     Past Medical, Surgical, and Family History reviewed and updated in chart.    Reviewed all medications by prescribing practitioner or clinical pharmacist (such as prescriptions, OTCs, herbal therapies and supplements) and documented in the medical record.    HPI  Patient denies any falls, urgent care, ER, hospitalization, new diagnoses, surgeries since they were here last.  Denies any issues with chest pain, chest pressure, constipation, diarrhea, blood in stool, urinary urgency, frequency, blood in urine, muscle weakness in arms and legs, numbness or tingling in fingers or toes.  COPD  Dr Chirinos-   2l o2 NC with rest 3L o2 with walking   SOB with rest and exertion   Patient Care Team:  LIS Barnes-CNP as PCP - General (Internal Medicine)     Review of Systems  Review of Systems negative except as noted in HPI and Chief complaint.    Current Outpatient Medications:     nitroglycerin (Nitrostat) 0.4 mg SL tablet, as directed Sublingual prn, Disp: , Rfl:     atorvastatin (Lipitor) 80 mg tablet, Take 1 tablet (80 mg) by mouth once daily., Disp: 90 tablet, Rfl: 1    ibandronate (Boniva) 150 mg tablet, Take 1 tablet (150 mg) by mouth every 30 (thirty) days. Take in morning with full glass of water on an empty stomach. No food, drink, meds, or lying down for 60 minutes after., Disp: 3 tablet, Rfl: 3    metoprolol succinate XL (Toprol-XL) 50 mg 24 hr tablet, Take 1 tablet (50 mg) by mouth early in the morning.., Disp: 90 tablet, Rfl: 1    QUEtiapine (SEROquel) 300 mg tablet, Take 1 tablet (300 mg) by mouth once daily at bedtime., Disp: 90 tablet, Rfl: 1    sertraline (Zoloft) 100 mg tablet, Take 1 tablet (100 mg) by mouth once daily., Disp: 90 tablet, Rfl: 1    Objective   Vitals:  /78 (BP Location: Left arm, Patient Position: Sitting, BP Cuff Size: Adult)   Pulse 75   Resp 16   Ht 1.575 m (5' 2\")  "  Wt 95.6 kg (210 lb 12.8 oz)   LMP  (LMP Unknown)   SpO2 95%   BMI 38.56 kg/m²       Physical Exam  Vitals reviewed.   Constitutional:       General: She is not in acute distress.     Appearance: Normal appearance.   HENT:      Head: Normocephalic.      Right Ear: Tympanic membrane normal.      Left Ear: Tympanic membrane normal.      Nose: Nose normal.      Mouth/Throat:      Mouth: Mucous membranes are moist.      Pharynx: Oropharynx is clear.   Eyes:      Extraocular Movements: Extraocular movements intact.      Conjunctiva/sclera: Conjunctivae normal.      Pupils: Pupils are equal, round, and reactive to light.   Cardiovascular:      Rate and Rhythm: Normal rate.      Pulses: Normal pulses.      Heart sounds: Normal heart sounds.   Pulmonary:      Effort: Pulmonary effort is normal.      Breath sounds: Decreased air movement present.      Comments: 2lNC O2  Abdominal:      General: Abdomen is flat. Bowel sounds are normal.      Palpations: Abdomen is soft.   Musculoskeletal:         General: Normal range of motion.   Skin:     General: Skin is warm and dry.      Capillary Refill: Capillary refill takes 2 to 3 seconds.   Neurological:      General: No focal deficit present.      Mental Status: She is alert and oriented to person, place, and time. Mental status is at baseline.   Psychiatric:         Mood and Affect: Mood normal.         Behavior: Behavior is cooperative.     Assessment & Plan  Mixed hyperlipidemia    HYPERLIPIDEMIA:  Your cholesterol level is elevated. Decrease intake of saturated fats, fast food, sweets.  Increase intake of fresh fruit fresh vegetables and lean meats.  Increase healthy fats seeds, nuts, olive oil instead of butter.  walk 150 minutes/week for heart health.   Aim for 25-30 grams of fiber in your diet daily.  May consider adding Fish Oil supplement 1,200 mg per day or Omega 3 Supplement daily.      Primary hypertension    HYPERTENSION:   Decrease intake of processed foods, fast  foods, lunch meat, canned soups, canned veggies.  Increase intake of fresh fruits, veggies, and lean meats. Monitor blood pressure at home, keep a log and bring this with you to your next appointment. Call the office if your blood pressure runs 150/90 or higher consistently.    Blood Pressure Technique:  Sit quietly in a chair for 5 minutes with back supported and feet on the floor  Then place left arm on a table or armrest so bicep area is at the same level of heart or left breast  Check three times in a row, disregard the highest reading and average the other two    Primary insomnia    Orders:    QUEtiapine (SEROquel) 300 mg tablet; Take 1 tablet (300 mg) by mouth once daily at bedtime.    Anxiety    ANXIETY:   Stress/Anxiety reduction interventions:    -Relaxation techniques- deep breathing, meditation, acupuncture, guided imagery, yoga  -3-3-3 sit down in a quiet room. Look around the room and name 3 items you see. Then close your eyes and listen and quietly name 3 items you hear around you. Lastly, move 3 different body parts in a [place] 10 times each  -Avoid caffeine, alcohol, illicit drug use  -Get enough sleep, 7-9 hours per night  -Eat a healthy diet, prioritizing lean proteins, fruits/vegetables, and whole grains  -Purposeful movement daily- walking, biking, strength training  -Speaking with a counselor can be very helpful.    Age-related osteoporosis without current pathological fracture    Dexa UTD  Continue Boniva  Encounter for screening mammogram for malignant neoplasm of breast    Orders:    BI mammo bilateral screening tomosynthesis; Future  MAMMOGRAM For Breast Cancer Screening:  I have ordered you a mammogram to be done as soon as you are able.  We will call the results.    Vitamin D deficiency    Orders:    Vitamin D 25-Hydroxy,Total (for eval of Vitamin D levels); Future  LAB Order/ BLOOD TESTS   I have ordered lab work for you to get done. This should be fasting. Nothing to eat or drink after  midnight besides black tea,  black coffee, or water. If you do not hear from this office within two days of having your labs done, please call for your results.     Elevated fasting glucose    Orders:    Hemoglobin A1C; Future  LAB Order/ BLOOD TESTS   I have ordered lab work for you to get done. This should be fasting. Nothing to eat or drink after midnight besides black tea,  black coffee, or water. If you do not hear from this office within two days of having your labs done, please call for your results.     Thrombocythemia    LAB Order/ BLOOD TESTS   I have ordered lab work for you to get done. This should be fasting. Nothing to eat or drink after midnight besides black tea,  black coffee, or water. If you do not hear from this office within two days of having your labs done, please call for your results.     Screening for thyroid disorder    Orders:    TSH with reflex to Free T4 if abnormal; Future  LAB Order/ BLOOD TESTS   I have ordered lab work for you to get done. This should be fasting. Nothing to eat or drink after midnight besides black tea,  black coffee, or water. If you do not hear from this office within two days of having your labs done, please call for your results.     Medicare annual wellness visit, subsequent    Orders:    Comprehensive Metabolic Panel; Future    CBC and Auto Differential; Future    Body mass index (BMI) 38.0-38.9, adult    Orders:    CBC and Auto Differential; Future    Obesity, morbid (Multi)    monitor caloric intake by keeping a daily log of all food you are eating or drinking.  My fitness pal is a great application you can use to monitor calorie intake.  Increase activity walking 150 minutes/week is highly recommended.  Joining the local Y or gym if feasible also recommended.    Simple chronic bronchitis (Multi)  Continue follow up with pulm as scheduled.   Encounter for screening for malignant neoplasm of colon    Orders:    Cologuard® colon cancer screening;  Future      Colon Cancer Screening:   I have ordered the cologuard to be sent to your home. This is to blood and cancer markers in your stool. If the results come back positive I will refer you to GI. If they are negative, repeat the test in 3 years.      This note was dictated using DRAGON speech recognition software and was corrected for spelling or grammatical errors, but despite proofreading several typographical errors might be present that might affect the meaning of the content.  Jenna Nieto CNP  Advanced care planning was discussed with Marilou Albarado today. We reviewed code status, Medical Power of , and Living will. Pt has LW or POA.

## 2025-02-13 NOTE — ASSESSMENT & PLAN NOTE
monitor caloric intake by keeping a daily log of all food you are eating or drinking.  My fitness pal is a great application you can use to monitor calorie intake.  Increase activity walking 150 minutes/week is highly recommended.  Joining the local Y or gym if feasible also recommended.

## 2025-02-13 NOTE — ASSESSMENT & PLAN NOTE
HYPERLIPIDEMIA:  Your cholesterol level is elevated. Decrease intake of saturated fats, fast food, sweets.  Increase intake of fresh fruit fresh vegetables and lean meats.  Increase healthy fats seeds, nuts, olive oil instead of butter.  walk 150 minutes/week for heart health.   Aim for 25-30 grams of fiber in your diet daily.  May consider adding Fish Oil supplement 1,200 mg per day or Omega 3 Supplement daily.

## 2025-02-13 NOTE — ASSESSMENT & PLAN NOTE
Orders:    Vitamin D 25-Hydroxy,Total (for eval of Vitamin D levels); Future  LAB Order/ BLOOD TESTS   I have ordered lab work for you to get done. This should be fasting. Nothing to eat or drink after midnight besides black tea,  black coffee, or water. If you do not hear from this office within two days of having your labs done, please call for your results.

## 2025-02-13 NOTE — ASSESSMENT & PLAN NOTE
Orders:    Hemoglobin A1C; Future  LAB Order/ BLOOD TESTS   I have ordered lab work for you to get done. This should be fasting. Nothing to eat or drink after midnight besides black tea,  black coffee, or water. If you do not hear from this office within two days of having your labs done, please call for your results.

## 2025-03-04 LAB — NONINV COLON CA DNA+OCC BLD SCRN STL QL: NEGATIVE

## 2025-03-08 LAB
25(OH)D3+25(OH)D2 SERPL-MCNC: 66 NG/ML (ref 30–100)
ALBUMIN SERPL-MCNC: 4.2 G/DL (ref 3.6–5.1)
ALP SERPL-CCNC: 103 U/L (ref 37–153)
ALT SERPL-CCNC: 27 U/L (ref 6–29)
ANION GAP SERPL CALCULATED.4IONS-SCNC: 9 MMOL/L (CALC) (ref 7–17)
AST SERPL-CCNC: 22 U/L (ref 10–35)
BASOPHILS # BLD AUTO: 51 CELLS/UL (ref 0–200)
BASOPHILS NFR BLD AUTO: 0.6 %
BILIRUB SERPL-MCNC: 0.3 MG/DL (ref 0.2–1.2)
BUN SERPL-MCNC: 18 MG/DL (ref 7–25)
CALCIUM SERPL-MCNC: 9.4 MG/DL (ref 8.6–10.4)
CHLORIDE SERPL-SCNC: 100 MMOL/L (ref 98–110)
CHOLEST SERPL-MCNC: 173 MG/DL
CHOLEST/HDLC SERPL: 3.7 (CALC)
CO2 SERPL-SCNC: 29 MMOL/L (ref 20–32)
CREAT SERPL-MCNC: 0.75 MG/DL (ref 0.5–1.05)
EGFRCR SERPLBLD CKD-EPI 2021: 87 ML/MIN/1.73M2
EOSINOPHIL # BLD AUTO: 136 CELLS/UL (ref 15–500)
EOSINOPHIL NFR BLD AUTO: 1.6 %
ERYTHROCYTE [DISTWIDTH] IN BLOOD BY AUTOMATED COUNT: 13.3 % (ref 11–15)
EST. AVERAGE GLUCOSE BLD GHB EST-MCNC: 143 MG/DL
EST. AVERAGE GLUCOSE BLD GHB EST-SCNC: 7.9 MMOL/L
GLUCOSE SERPL-MCNC: 109 MG/DL (ref 65–99)
HBA1C MFR BLD: 6.6 % OF TOTAL HGB
HCT VFR BLD AUTO: 39.7 % (ref 35–45)
HDLC SERPL-MCNC: 47 MG/DL
HGB BLD-MCNC: 12.7 G/DL (ref 11.7–15.5)
LDLC SERPL CALC-MCNC: 92 MG/DL (CALC)
LYMPHOCYTES # BLD AUTO: 2975 CELLS/UL (ref 850–3900)
LYMPHOCYTES NFR BLD AUTO: 35 %
MCH RBC QN AUTO: 28.7 PG (ref 27–33)
MCHC RBC AUTO-ENTMCNC: 32 G/DL (ref 32–36)
MCV RBC AUTO: 89.8 FL (ref 80–100)
MONOCYTES # BLD AUTO: 808 CELLS/UL (ref 200–950)
MONOCYTES NFR BLD AUTO: 9.5 %
NEUTROPHILS # BLD AUTO: 4531 CELLS/UL (ref 1500–7800)
NEUTROPHILS NFR BLD AUTO: 53.3 %
NONHDLC SERPL-MCNC: 126 MG/DL (CALC)
PLATELET # BLD AUTO: 456 THOUSAND/UL (ref 140–400)
PMV BLD REES-ECKER: 10.9 FL (ref 7.5–12.5)
POTASSIUM SERPL-SCNC: 5.2 MMOL/L (ref 3.5–5.3)
PROT SERPL-MCNC: 7.1 G/DL (ref 6.1–8.1)
RBC # BLD AUTO: 4.42 MILLION/UL (ref 3.8–5.1)
SODIUM SERPL-SCNC: 138 MMOL/L (ref 135–146)
TRIGL SERPL-MCNC: 243 MG/DL
TSH SERPL-ACNC: 1.24 MIU/L (ref 0.4–4.5)
WBC # BLD AUTO: 8.5 THOUSAND/UL (ref 3.8–10.8)

## 2025-03-18 ENCOUNTER — OFFICE VISIT (OUTPATIENT)
Dept: PRIMARY CARE | Facility: CLINIC | Age: 70
End: 2025-03-18
Payer: MEDICARE

## 2025-03-18 ENCOUNTER — TELEPHONE (OUTPATIENT)
Dept: PRIMARY CARE | Facility: CLINIC | Age: 70
End: 2025-03-18

## 2025-03-18 VITALS
SYSTOLIC BLOOD PRESSURE: 113 MMHG | WEIGHT: 214.8 LBS | HEIGHT: 62 IN | RESPIRATION RATE: 16 BRPM | DIASTOLIC BLOOD PRESSURE: 66 MMHG | BODY MASS INDEX: 39.53 KG/M2 | OXYGEN SATURATION: 97 % | HEART RATE: 81 BPM

## 2025-03-18 DIAGNOSIS — E11.65 TYPE 2 DIABETES MELLITUS WITH HYPERGLYCEMIA, WITHOUT LONG-TERM CURRENT USE OF INSULIN: Primary | ICD-10-CM

## 2025-03-18 PROBLEM — E11.9 DIABETES MELLITUS WITHOUT COMPLICATION: Status: ACTIVE | Noted: 2025-03-18

## 2025-03-18 PROCEDURE — 3008F BODY MASS INDEX DOCD: CPT

## 2025-03-18 PROCEDURE — 3074F SYST BP LT 130 MM HG: CPT

## 2025-03-18 PROCEDURE — 1159F MED LIST DOCD IN RCRD: CPT

## 2025-03-18 PROCEDURE — 1126F AMNT PAIN NOTED NONE PRSNT: CPT

## 2025-03-18 PROCEDURE — 3078F DIAST BP <80 MM HG: CPT

## 2025-03-18 PROCEDURE — 1124F ACP DISCUSS-NO DSCNMKR DOCD: CPT

## 2025-03-18 PROCEDURE — 1036F TOBACCO NON-USER: CPT

## 2025-03-18 PROCEDURE — 99213 OFFICE O/P EST LOW 20 MIN: CPT

## 2025-03-18 RX ORDER — TIRZEPATIDE 2.5 MG/.5ML
2.5 INJECTION, SOLUTION SUBCUTANEOUS WEEKLY
Qty: 2 ML | Refills: 0 | Status: SHIPPED | OUTPATIENT
Start: 2025-03-18

## 2025-03-18 ASSESSMENT — ENCOUNTER SYMPTOMS
OCCASIONAL FEELINGS OF UNSTEADINESS: 0
LOSS OF SENSATION IN FEET: 0
DEPRESSION: 0

## 2025-03-18 ASSESSMENT — PAIN SCALES - GENERAL: PAINLEVEL_OUTOF10: 0-NO PAIN

## 2025-03-18 NOTE — ASSESSMENT & PLAN NOTE
Orders:    tirzepatide (Mounjaro) 2.5 mg/0.5 mL pen injector; Inject 2.5 mg under the skin 1 (one) time per week.

## 2025-03-18 NOTE — TELEPHONE ENCOUNTER
Patient called and said the mounjaro is covered but it is $600. She can't afford that. She called her insurance company who said that she can get Ozempic through patient assistance for $59. She said that they are sending her the paperwork and once she has the $59 she will bring the paperwork up so that we can fill out our portion and send in an Rx with it. She said that they told her it would be 7-10 business days for her to receive the paperwork.

## 2025-03-18 NOTE — PATIENT INSTRUCTIONS
Assessment & Plan  Type 2 diabetes mellitus with hyperglycemia, without long-term current use of insulin    Orders:    [facility] referral- diabetes education    tirzepatide (Mounjaro) 2.5 mg/0.5 mL pen injector; Inject 2.5 mg under the skin 1 (one) time per week.    Begin Mounjaro 2.5 mg once weekly  You will have to keep the same injection day weekly  Possible side effects as discussed constipation nausea  Increase fiber also increase water intake    Mounjaro is requiring a prior authorization once this comes back if it is not feasible we discussed metformin once daily patient is aware and willing to take metformin  Placed referral to diabetes education to discuss diet and lifestyle management of this diagnosis

## 2025-03-18 NOTE — PROGRESS NOTES
"Subjective   Patient ID: Marilou Albarado is a 69 y.o. female who presents for diabetes medication.    HPI   Recent A1c 6.6 fasting glucose 109  Patient is here today to discuss beginning diabetic medications  Discussed initiating metformin 500 mg once daily patient asked to discuss \"1 of those injectables\" for weight loss weight management      Review of Systems  Review of Systems negative except as noted in HPI and Chief complaint.    Current Outpatient Medications:     atorvastatin (Lipitor) 80 mg tablet, Take 1 tablet (80 mg) by mouth once daily., Disp: 90 tablet, Rfl: 1    ibandronate (Boniva) 150 mg tablet, Take 1 tablet (150 mg) by mouth every 30 (thirty) days. Take in morning with full glass of water on an empty stomach. No food, drink, meds, or lying down for 60 minutes after., Disp: 3 tablet, Rfl: 3    metoprolol succinate XL (Toprol-XL) 50 mg 24 hr tablet, Take 1 tablet (50 mg) by mouth early in the morning.., Disp: 90 tablet, Rfl: 1    nitroglycerin (Nitrostat) 0.4 mg SL tablet, as directed Sublingual prn, Disp: , Rfl:     QUEtiapine (SEROquel) 300 mg tablet, Take 1 tablet (300 mg) by mouth once daily at bedtime., Disp: 90 tablet, Rfl: 1    sertraline (Zoloft) 100 mg tablet, Take 1 tablet (100 mg) by mouth once daily., Disp: 90 tablet, Rfl: 1    tirzepatide (Mounjaro) 2.5 mg/0.5 mL pen injector, Inject 2.5 mg under the skin 1 (one) time per week., Disp: 2 mL, Rfl: 0    Objective   /66 (BP Location: Left arm, Patient Position: Sitting, BP Cuff Size: Adult)   Pulse 81   Resp 16   Ht 1.575 m (5' 2\")   Wt 97.4 kg (214 lb 12.8 oz)   LMP  (LMP Unknown)   SpO2 97%   BMI 39.29 kg/m²     Physical Exam  Vitals reviewed.   Cardiovascular:      Rate and Rhythm: Normal rate.   Musculoskeletal:      Cervical back: Normal range of motion.   Neurological:      General: No focal deficit present.      Mental Status: She is alert.   Psychiatric:         Mood and Affect: Mood normal.         Assessment/Plan "   Assessment & Plan  Type 2 diabetes mellitus with hyperglycemia, without long-term current use of insulin    Orders:    tirzepatide (Mounjaro) 2.5 mg/0.5 mL pen injector; Inject 2.5 mg under the skin 1 (one) time per week.    Begin Mounjaro 2.5 mg once weekly  You will have to keep the same injection day weekly  Possible side effects as discussed constipation nausea  Increase fiber also increase water intake    Mounjaro is requiring a prior authorization once this comes back if it is not feasible we discussed metformin once daily patient is aware and willing to take metformin  Placed referral to diabetes education to discuss diet and lifestyle management of this diagnosis  This note was dictated using DRAGON speech recognition software and was corrected for spelling or grammatical errors, but despite proofreading several typographical errors might be present that might affect the meaning of the content.  Jenna Nieto CNP  Advanced care planning was discussed with Marilou Albarado today. We reviewed code status, Medical Power of , and Living will. Pt has LW or POA.

## 2025-04-01 ENCOUNTER — NUTRITION (OUTPATIENT)
Dept: NUTRITION | Facility: HOSPITAL | Age: 70
End: 2025-04-01
Payer: MEDICARE

## 2025-04-01 DIAGNOSIS — E11.65 TYPE 2 DIABETES MELLITUS WITH HYPERGLYCEMIA, WITHOUT LONG-TERM CURRENT USE OF INSULIN: ICD-10-CM

## 2025-04-01 NOTE — PROGRESS NOTES
Reason for Visit:  Marilou Albarado is a 69 y.o. female who presents for Initial DSME Visit    DSME - Global Assessment  Assessment   Referring Provider:Jenna Nieto  Marital Status:  window .  Support Person: Name: Marilyn and Relationship to patient: child.    What do you hope to gain from this diabetes education visit?  . Interested in learning about eating a diet to help reduce chances of complication.    Have you had diabetes education in the past?  No.  In your words, what is Diabetes: high sugar  What concerns you most about having diabetes: complications    Household Composition: living independently, alone    Type of Diabetes: Type 2  What year were you diagnosed with diabetes: 2025  Family History: sister,aunt s on moms side    Comorbidities/Chronic Complications: sleep apnea, hypertension, hyperlipidemia, and COPD    Lab Results   Component Value Date    HGBA1C 6.6 (H) 03/07/2025    HGBA1C 6.0 04/13/2023    HGBA1C 5.9 11/05/2021       Demographics:   Difficulties with: psychosocial barriers and lack of support system  Race/Ethnic Origin: White/  Occupation:  Retired from factory work   Work hours: retired for 7 years    Health Status:  Smoking/Tobacco Use: No, patient does not smoke or use tobacco.  No alcohol use    Health Utilization Past 12 Months:   Hospital Admissions: No.  ER Visits: No.  Primary Care Visits: Yes.  Dentist : does not dentist ,due to cost. Nurse explained the importance of good dental care . She may be eligible for reduced cost or free dental care. Nurse will attempt to locate possible resources.    Diabetes Self-Management Skills and Behaviors:   Do you exercise regularly?: No.  Physical Activity : does recommended   No. Average amount of hours slept per night: 3-4 hours interrupted by urination  How do you manage your diabetes when you are sick?: unsure, call doctor, and drink more fluids    Diabetes Medications: non-insulin injectables  Current Outpatient Medications    Medication Sig Dispense Refill    atorvastatin (Lipitor) 80 mg tablet Take 1 tablet (80 mg) by mouth once daily. 90 tablet 1    ibandronate (Boniva) 150 mg tablet Take 1 tablet (150 mg) by mouth every 30 (thirty) days. Take in morning with full glass of water on an empty stomach. No food, drink, meds, or lying down for 60 minutes after. 3 tablet 3    metoprolol succinate XL (Toprol-XL) 50 mg 24 hr tablet Take 1 tablet (50 mg) by mouth early in the morning.. 90 tablet 1    nitroglycerin (Nitrostat) 0.4 mg SL tablet as directed Sublingual prn      QUEtiapine (SEROquel) 300 mg tablet Take 1 tablet (300 mg) by mouth once daily at bedtime. 90 tablet 1    sertraline (Zoloft) 100 mg tablet Take 1 tablet (100 mg) by mouth once daily. 90 tablet 1    tirzepatide (Mounjaro) 2.5 mg/0.5 mL pen injector Inject 2.5 mg under the skin 1 (one) time per week. 2 mL 0     No current facility-administered medications for this visit.       Monitorng: Does not currently have a Glucometer. Nurse requested PCP to order. Nurse instructed her on use of a glucometer. After she receives she can speak to nurse about how to use the type of monitor she receives   BS taken today was 114    Acute Complications-Safety: carries identification    Hypoglycemia: None  Hypoglycemia Treatment: Educated on S/S of hypoglycemia and 15/15 rule for treatment. In addition consuming protein snack once sugar back in range.     Hyperglycemia: polydipsia and tiredness  Hyperglycemia Treatment: Patient educated on the importance of water and walking if blood sugar high after a meal. . Call PCP if sugar goes above 400-500. In addition if vomiting and high blood sugar seek treatment at ER. Nurse explained that when engaging in exercise or strenuous activity, to consume protein prior to activity and stay well hydrated. It is recommended that blood sugar not be below 120 prior to vigorous exercise.       Diabetes Assessment:   DM Interferes with other aspects of my  life: neutral.  My level of stress is high: neutral.  I struggle with making changes in my life: neutral.  How do you handle stress: going to [employer],medication  Most difficult part of managing DM: newly diagnosed        DSME - Meal Planning and Diet Recall  Nutrition Assessment    Are you currently following any meal plan: No Plan.    Does your culture or Mosque require any of the following:  N/A  Who does the grocery shopping? patient  Who does the cooking in the home? patient    How often do you eat out? McDonalds, taco bell, arbys,fast food.   How many meals do you eat in per day: three.  Which meals do you tend to skip: breakfast  What do you drink with and between meals: coffee, sugary drinks, and ginger ale, 3-4  cans a day    Diet Recall: Has changed diet since diagnosis.  She has reduced the amount of fast food eaten,she has eliminated snacks.  Nurse recommended to replace pop with water  or diet ginger ale.     Breakfast: eggs,toast    Dinner: salami sandwich, pop, chili, ham steaks corn on the lyle salad. Green beans,   Snack: 2 clementines, cupcakes cookies, donuts (prior to diagnosis)       DSME - Goals and Recommendations    [facility] Diabetes Education Program SMART Behavior Goal Setting:        S - Specific: Exactly what do you want to do        M - Measurable: Use a calendar or chart to track progress        A - Attainable: Take small steps to make bigger changes        R - Realistic: Pick something reasonable that you know you can do        T - Time Oriented: Choose a time limit (No longer than 6 months)    Specific Goal:   I will check my blood sugar 1-2 x daily. Fasting and or 2 hours post meal, record levels and take a log book to doctors visits.  I will check the bottoms of my feet and between my toes daily for cuts, sores, open areas and any changes in color. I will report problems to my doctor promptly.   I will eat a heart healthy diet.  I will count carbohydrates at each meal.    I will exercise 1-2 times/week.    Measurable: How will I track my goal:  I will keep track of my progress daily by iman on phone and glucometer .     Time Oriented: two months.     Topics Covered and Impression:   DSME Topics Covered During Visit:   A1c Review  Understanding Diabetes Basics  Learning to [place] with Stress, Depression and Other Concerns  Reducing Your Risk For Other Álvaro Concerns  Reviewed Chronic Complications/Risks Related to Diabetes  Being Physically Active  Using a Blood Sugar Monitor  Review Glycemic Goals (CGM or SMBG)  Reviewed Hypoglycemia Signs/Symptoms/Tx Plan  Reviewed Hyperglycemia Signs/Symptoms/Tx Plan  Healthy Meal Plan  MyPlate Method    DSME Topics for Follow-Up:   Learning to Homestead with Stress, Depression and Other Concerns  Routine Health Assessment and Labs  Reviewing Medication Classes  Taking Medications as Prescribed  Being Physically Active  CGM Start Education  Review Glycemic Goals (CGM or SMBG)  Glycemic Pattern Management  Healthy Meal Plan  MyPlate Method  Sick Day Rules    Readiness to Learn: demonstrates willingness to learn and demonstrates ability to learn  Preferred learning method: observing, listening, and doing    DSME - Diabetes Self-Management Support    My Diabetes Self-Management Support Plan (Resources) : Age Well. Be Well Club (for age 55 and over). Call 1-154.456.3641 or www.meevl.org/Getlenses.co.uk. Offer free emailed publication on all [facility] health events and talks with some virtual options available.     Exercise Support : Silver Sneakers - free fitness program for seniors. For eligibility and locations: [email or web address] Volo/    Diabetes Support Groups : Attend free lifestyle management & diabetes related talks    Stress Relief : Stress reduction CD's from library or take a class on stress reduction techniques    Magazines and Journals : Diabetes Forecast 1-856.859.6620 www.diabetesforecast.org  Diabetes Self Management 1-331.614.1698  www.diabetesselfmanagement.com    Websites & Large Online Community Forums and Support : (www.dagc.org  www.diabetes.org  www.eatright.org  www.dLife.com)  www.diabetes.org/ (American Diabetes Association) - Call with a question or chat online. Online support community forum for: Type 1 diabetes, Type 2 diabetes and Caregivers.   www.diabetesdaily.com Online support community forum for all types of diabetes    APPS : Glucose Erik (free, tracks blood glucose, graphs),   My Fitness Pal (free)  Map My Walk (free, tracks exercise)  Materials provided during today's visit: Handouts on hypoglycemia and hyperglycemia, diet, carb counting, label reading, and plate method. Food group choices for healthy meal plans. Additional materials on online diabetes educational resources.    Provider Impression:  Pt is  interested in lifestyle and diet change to control her type 2 diabetes. Pt encouraged to choose a variety of foods, to maintain consistent blood glucose levels. Balanced diet to incorporate carb, protein, healthy fats.High fiber foods encouraged to help maintain BS within normal range.   Nurse educated on the plate method and explained why consumption of too many carb's, fats and processed foods will continue to keep blood sugar high. This despite a medication regime.   Patient encouraged to start a purposeful exercise program. This may be helpful in reducing his overall blood sugar.   Trial CGM recommended. Nurse explain that this is a tool which can help her understand her blood sugar by wearing 24 hrs a day for 10 days.  Sleep also topic of discussion. , recommendation to speak with PCP about advances with sleep apnea machines    Reviewed variables that effect blood sugar readings and overall A1c outside of food sources.  Pt encouraged to take medication as prescribed.   Nurse recommends return for further education  after she starts taking blood sugar and monitors diet.     Time: I personally spent a total of 90  minutes with the patient providing diabetes self-management education. Visit documentation will be sent electronically to referring provider.

## 2025-04-08 ENCOUNTER — TELEPHONE (OUTPATIENT)
Dept: PRIMARY CARE | Facility: CLINIC | Age: 70
End: 2025-04-08
Payer: MEDICARE

## 2025-04-08 DIAGNOSIS — G47.33 OSA (OBSTRUCTIVE SLEEP APNEA): ICD-10-CM

## 2025-04-08 DIAGNOSIS — E11.65 TYPE 2 DIABETES MELLITUS WITH HYPERGLYCEMIA, WITHOUT LONG-TERM CURRENT USE OF INSULIN: Primary | ICD-10-CM

## 2025-04-08 DIAGNOSIS — Z74.09 DECREASED MOBILITY AND ENDURANCE: ICD-10-CM

## 2025-04-08 DIAGNOSIS — E11.65 TYPE 2 DIABETES MELLITUS WITH HYPERGLYCEMIA, WITHOUT LONG-TERM CURRENT USE OF INSULIN: ICD-10-CM

## 2025-04-08 RX ORDER — INSULIN PUMP SYRINGE, 3 ML
EACH MISCELLANEOUS
Qty: 1 EACH | Refills: 0 | Status: SHIPPED | OUTPATIENT
Start: 2025-04-08

## 2025-04-08 RX ORDER — INSULIN PUMP SYRINGE, 3 ML
EACH MISCELLANEOUS
Qty: 1 EACH | Refills: 0 | Status: SHIPPED | OUTPATIENT
Start: 2025-04-08 | End: 2025-04-08 | Stop reason: SDUPTHER

## 2025-04-08 RX ORDER — LANCETS
1 EACH MISCELLANEOUS DAILY
Qty: 100 EACH | Refills: 1 | Status: SHIPPED | OUTPATIENT
Start: 2025-04-08

## 2025-04-08 RX ORDER — LANCETS 26 GAUGE
EACH MISCELLANEOUS
Qty: 100 EACH | Refills: 1 | Status: SHIPPED | OUTPATIENT
Start: 2025-04-08 | End: 2026-04-08

## 2025-04-08 RX ORDER — INSULIN PUMP SYRINGE, 3 ML
EACH MISCELLANEOUS
Qty: 1 EACH | Refills: 0 | Status: SHIPPED | OUTPATIENT
Start: 2025-04-08 | End: 2026-04-08

## 2025-04-08 RX ORDER — BLOOD-GLUCOSE CONTROL, NORMAL
1 EACH MISCELLANEOUS DAILY
COMMUNITY

## 2025-04-08 RX ORDER — BLOOD-GLUCOSE CONTROL, NORMAL
1 EACH MISCELLANEOUS DAILY
Qty: 100 EACH | Refills: 0 | Status: CANCELLED | OUTPATIENT
Start: 2025-04-08

## 2025-04-08 NOTE — TELEPHONE ENCOUNTER
"Subjective   Reynaldo Madden is a 57 y.o. male.     History of Present Illness     Reynaldo Madden male 57 y.o., /60 (BP Location: Right arm, Patient Position: Sitting, Cuff Size: Adult)   Pulse 68   Temp 97.5 °F (36.4 °C)   Resp 16   Ht 188 cm (74.02\")   Wt 132 kg (291 lb)   SpO2 98%   BMI 37.35 kg/m²   who presents today for follow up of Depression.  He reports Pristiq is helping depression. Seems about the same as Lexapro---want him to try 100mg.  I had him switch d/t sexual side effects. Has had many family stresses---not sure is less sexual side effects yet. Ok go up on dose and f/u 1 mo. Onset of symptoms was approximately several years ago.  He denies current suicidal and homicidal ideation. Risk factors are family history of anxiety and or depression and lifestyle of multiple roles.  Previous treatment includes current Rx.  He complains of the following medication side effects: none. The patient declines to go to counseling..    Back on thyroid Rx and will get non fasting labs today    Did see cardio------DR Kumari 7-13-21---neg stress echo---had work up abn EKG--neg  Statins raise his LFTs; on Zetia  He did see nephrology and I do want a note that I am expected that his creatinine will run normally around 1.4-1.5.  The rest of his work-up was negative.  The following portions of the patient's history were reviewed and updated as appropriate: allergies, current medications, past family history, past medical history, past social history, past surgical history and problem list.    Review of Systems   Constitutional: Negative for activity change, appetite change and unexpected weight change.   HENT: Negative for nosebleeds and trouble swallowing.    Eyes: Negative for pain and visual disturbance.   Respiratory: Negative for chest tightness, shortness of breath and wheezing.    Cardiovascular: Negative for chest pain and palpitations.   Gastrointestinal: Negative for abdominal pain and blood in stool. " [facility] called back and the meter and test strips were for One Touch however the lancets sent in are for Accu-Chek. Can you please send in One Touch Delica lancets? I have chosen the correct ones.     Endocrine: Negative.    Genitourinary: Negative for difficulty urinating and hematuria.   Musculoskeletal: Negative for joint swelling.   Skin: Negative for color change and rash.   Allergic/Immunologic: Negative.    Neurological: Negative for syncope and speech difficulty.   Hematological: Negative for adenopathy.   Psychiatric/Behavioral: Negative for agitation, confusion and self-injury.   All other systems reviewed and are negative.      Objective   Physical Exam  Vitals and nursing note reviewed.   Constitutional:       General: He is not in acute distress.     Appearance: He is well-developed. He is obese. He is not diaphoretic.   HENT:      Head: Normocephalic.   Eyes:      Conjunctiva/sclera: Conjunctivae normal.      Pupils: Pupils are equal, round, and reactive to light.   Cardiovascular:      Rate and Rhythm: Normal rate.   Pulmonary:      Effort: Pulmonary effort is normal.   Musculoskeletal:         General: Normal range of motion.      Cervical back: Normal range of motion and neck supple.   Skin:     General: Skin is warm and dry.      Findings: No rash.   Neurological:      General: No focal deficit present.      Mental Status: He is alert and oriented to person, place, and time.   Psychiatric:         Mood and Affect: Affect is not inappropriate.         Behavior: Behavior normal.         Thought Content: Thought content normal.         Judgment: Judgment normal.         Assessment/Plan   Diagnoses and all orders for this visit:    1. Recurrent major depressive disorder, in full remission (CMS/[facility]) (Primary)    2. Mixed hyperlipidemia    3. Acquired hypothyroidism    labs not fasting  Plan, Reynaldo Madden, was seen today.  he was seen for Hyperlipidemia and will continue current medication, Hypothyroidism and will need to update labs for continued treatment and Vitamin D deficiency and will update labs .  Raise dose Pristiq --helping and see if 100mg better; update me on sexual side effects if  any  F/u 1 mo

## 2025-04-08 NOTE — TELEPHONE ENCOUNTER
"[facility] pharmacy called and said that the blood glucose control solution has to have specific instruction on how to use. He said that it can say \"Use to calibrate monthly, once a day, with every new strip bottle\". Please re-send with new Sig.  "

## 2025-04-11 ENCOUNTER — TELEPHONE (OUTPATIENT)
Dept: PRIMARY CARE | Facility: CLINIC | Age: 70
End: 2025-04-11
Payer: MEDICARE

## 2025-04-11 NOTE — TELEPHONE ENCOUNTER
Neena from nurse over at pulmonary rehab called requesting a call back to speak with you in regards to some issues and concerns with pt and referral can be reached at  [phone], Advised to get information with the concerns and issue bu ask to speak directly with provider

## 2025-04-23 ENCOUNTER — APPOINTMENT (OUTPATIENT)
Dept: PRIMARY CARE | Facility: CLINIC | Age: 70
End: 2025-04-23
Payer: MEDICARE

## 2025-04-24 ENCOUNTER — APPOINTMENT (OUTPATIENT)
Dept: PRIMARY CARE | Facility: CLINIC | Age: 70
End: 2025-04-24
Payer: MEDICARE

## 2025-04-25 DIAGNOSIS — J44.9 CHRONIC OBSTRUCTIVE PULMONARY DISEASE, UNSPECIFIED COPD TYPE (MULTI): Primary | ICD-10-CM

## 2025-04-29 ENCOUNTER — CLINICAL SUPPORT (OUTPATIENT)
Dept: CARDIAC REHAB | Facility: CLINIC | Age: 70
End: 2025-04-29
Payer: MEDICARE

## 2025-04-29 VITALS
BODY MASS INDEX: 39.38 KG/M2 | HEIGHT: 62 IN | WEIGHT: 214 LBS | OXYGEN SATURATION: 93 % | DIASTOLIC BLOOD PRESSURE: 78 MMHG | SYSTOLIC BLOOD PRESSURE: 132 MMHG

## 2025-04-29 DIAGNOSIS — J44.9 CHRONIC OBSTRUCTIVE PULMONARY DISEASE, UNSPECIFIED COPD TYPE (MULTI): ICD-10-CM

## 2025-04-29 DIAGNOSIS — G47.33 OSA (OBSTRUCTIVE SLEEP APNEA): ICD-10-CM

## 2025-04-29 DIAGNOSIS — Z74.09 DECREASED MOBILITY AND ENDURANCE: ICD-10-CM

## 2025-04-29 RX ORDER — BUDESONIDE AND FORMOTEROL FUMARATE DIHYDRATE 80; 4.5 UG/1; UG/1
2 AEROSOL RESPIRATORY (INHALATION)
COMMUNITY

## 2025-04-29 RX ORDER — ALBUTEROL SULFATE 90 UG/1
2 INHALANT RESPIRATORY (INHALATION) EVERY 6 HOURS PRN
COMMUNITY

## 2025-04-29 ASSESSMENT — PATIENT HEALTH QUESTIONNAIRE - PHQ9
4. FEELING TIRED OR HAVING LITTLE ENERGY: MORE THAN HALF THE DAYS
SUM OF ALL RESPONSES TO PHQ9 QUESTIONS 1 & 2: 3
9. THOUGHTS THAT YOU WOULD BE BETTER OFF DEAD, OR OF HURTING YOURSELF: NOT AT ALL
8. MOVING OR SPEAKING SO SLOWLY THAT OTHER PEOPLE COULD HAVE NOTICED. OR THE OPPOSITE, BEING SO FIGETY OR RESTLESS THAT YOU HAVE BEEN MOVING AROUND A LOT MORE THAN USUAL: NOT AT ALL
2. FEELING DOWN, DEPRESSED OR HOPELESS: SEVERAL DAYS
5. POOR APPETITE OR OVEREATING: SEVERAL DAYS
6. FEELING BAD ABOUT YOURSELF - OR THAT YOU ARE A FAILURE OR HAVE LET YOURSELF OR YOUR FAMILY DOWN: NOT AT ALL
3. TROUBLE FALLING OR STAYING ASLEEP OR SLEEPING TOO MUCH: NOT AT ALL
7. TROUBLE CONCENTRATING ON THINGS, SUCH AS READING THE NEWSPAPER OR WATCHING TELEVISION: NOT AT ALL
SUM OF ALL RESPONSES TO PHQ QUESTIONS 1-9: 6
SUM OF ALL RESPONSES TO PHQ QUESTIONS 1-9: 6
1. LITTLE INTEREST OR PLEASURE IN DOING THINGS: MORE THAN HALF THE DAYS

## 2025-04-29 ASSESSMENT — DUKE ACTIVITY SCORE INDEX (DASI)
CAN YOU DO MODERATE WORK AROUND THE HOUSE LIKE VACUUMING, SWEEPING FLOORS OR CARRYING GROCERIES: YES
CAN YOU PARTICIPATE IN STRENOUS SPORTS LIKE SWIMMING, SINGLES TENNIS, FOOTBALL, BASKETBALL, OR SKIING: NO
CAN YOU TAKE CARE OF YOURSELF (EAT, DRESS, BATHE, OR USE TOILET): YES
CAN YOU RUN A SHORT DISTANCE: NO
CAN YOU HAVE SEXUAL RELATIONS: NO
CAN YOU PARTICIPATE IN MODERATE RECREATIONAL ACTIVITIES LIKE GOLF, BOWLING, DANCING, DOUBLES TENNIS OR THROWING A BASEBALL OR FOOTBALL: NO
TOTAL_SCORE: 21.45
CAN YOU WALK INDOORS, SUCH AS AROUND YOUR HOUSE: YES
CAN YOU DO HEAVY WORK AROUND THE HOUSE LIKE SCRUBBING FLOORS OR LIFTING AND MOVING HEAVY FURNITURE: YES
CAN YOU DO YARD WORK LIKE RAKING LEAVES, WEEDING OR PUSHING A MOWER: NO
CAN YOU CLIMB A FLIGHT OF STAIRS OR WALK UP A HILL: NO
DASI METS SCORE: 5.4
CAN YOU DO LIGHT WORK AROUND THE HOUSE LIKE DUSTING OR WASHING DISHES: YES
CAN YOU WALK A BLOCK OR TWO ON LEVEL GROUND: YES

## 2025-04-29 NOTE — PROGRESS NOTES
Pulmonary Rehabilitation Initial Treatment Plan    Name: Marilou Albarado  Medical Record Number: [id number]  YOB: 1955  Age: 69 y.o.    Today’s Date: 4/29/2025  Primary Care Physician: LIS Barnes-CNP  Referring Physician: Jenna Nieto, APR*/Perla Soriano MD  Program Location: [address]  Primary Diagnosis:   COPD   Onset/Date of Diagnosis: approx 2018  Initial Assessment, not yet started program.  AACVPR Risk Stratification: moderate  Falls Risk: Low  Psychosocial Assessment  Pre PHQ-9: 6  Sent PH-Q 9 to MD if score > 20: No; score < 20    Stress Management    Pt reported/currently experiencing stress: Yes; Stress; Severity: severe  Patient uses stress management skills: No   History of: depression  Currently seeing a mental health provider: No  Social Support: Limited support, daughter in [place]  Pre CAT score: 15   Post CAT score: Survey to be given at discharge.   Learning Assessment:  Learning assessment/barriers: None  Preferred learning method: Visual, Reading handout, and Writing handout  Barriers: None  Comments:  Stages of Change:Preparation    Psychosocial Plan  Goal Status: Initial Assessment; goals not yet started  Psychosocial Goals: Demonstrating proper techniques for stress management, Maintain or lower PH-Q 9 score by discharge, Identify strategies for managing depression, and Identify social supports  Psychosocial Interventions/Education:  TBD         Oxygen Assessment    SpO2 at rest: 97 %  SpO2 with exertion: 94 %    Oxygen Use    Supplemental O2 prescribed: Yes,   Liters at rest: 2-3 L  Liters with exertion: 2-3 L    SpO2 at rest: 97 %  SpO2 with exertion: 94 %    Using O2 as prescribed: Yes  DMEVilma Linton      Demonstrates appropriate knowledge of O2 use: Yes   Demonstrates appropriate knowledge of O2 safety: Yes     Home O2 System: Concentrator  Portable O2 System: Portable Tank    Hypoxia managed: Yes   Home Pulse Oximeter: No     Pre MMRC:  "3  Post MMRC:     Oxygen Plan  Goal Status: Initial Assessment; goals not yet started  Oxygen Goals: Decrease MMRC score, Learn and use pursed lip breathing as needed, and Titrate supplemental O2 as needed to keep SpO2 at 88% or greater  Oxygen Education/Interventions:   To be done in Pulmonary Rehab.    Nutrition Assessment:    Hyperlipidemia: Yes     Lipids:   Lab Results   Component Value Date    CHOL 173 03/07/2025    HDL 47 (L) 03/07/2025    TRIG 243 (H) 03/07/2025       Current Dietary Guidelines:  no special diet  Barriers to dietary change: no    Diet Habit Survey: Picture Your Plate  Pre: Initial survey given. Pending completion and return from patient.  Post: To be done at discharge.    Diabetes Assessment    Lab Results   Component Value Date    HGBA1C 6.6 (H) 03/07/2025       History of Diabetes: Yes Pt monitors BS at home: Yes   Frequency: 1 /day  FBS range: 116   Hypoglycemic Episodes: No     Weight Management    Height: 157.5 cm (5' 2\")  Weight: 97.1 kg (214 lb)  BMI (Calculated): 39.13  No data recorded    Nutrition Plan    Goal Status: Initial Assessment; goals not yet started  Nutrition Goals: Improve Diet Habit Survey score by 5-10 points by discharge, Learn how to read and interpret nutrition labels prior to discharge, and Lose 1lb/week while enrolled in program  Nutrition Interventions/Education:   TBD        Exercise Assessment    No  Mode: NA  Frequency: NA  Duration: NA    6 Minute Walk Assessment     6 MWT distance:  1060 ft  FiO2 used during test: 3 Liters    Exercise Prescription      Exercise Prescription based on: 6 Minute Walk Test          Frequency:  3 days/week   Mode: Treadmill, NuStep, and Recumbent Cycle   Duration: 24 total aerobic minutes   Intensity: RPE 12-14  Target HR:   RPE/RPD  MET Level: 2.2  Patient wears supplemental O2: Yes;   O2 Flow Rate: 2 to 3 L/min  SpO2 Range: 88 to 98 %     Modality Workload METs Duration (minutes)   1 Pre-Exercise      2 Treadmill 1.5 mph  2.2 " 8 :00   3 NuStep Load 3  42 casiano  2.2 8 :00   4 Recumbent Bike Load 2 @ 55 rpm  2.2 8 :00   5 Tom Valentinene 0.8 Load  2.2 8 :00   6 Post-Exercise        Resistance Training: No   Home Exercise Prescription given: To be given prior to discharge from program.    Exercise Plan  Goal Status: Initial Assessment; goals not yet started  Exercise Goals: Increase exercise MET level by 5-10% each week, Increase total exercise duration to 30-45 minutes, Initiate strength training 2-3 days a week, and Establish a home exercise program before discharge  Exercise Interventions/Education:   TBD        Other Core Components/Risk Factor Assessment:    Medication adherence  Current Medications:   Medication Documentation Review Audit       Reviewed by Neena Ruelas RN (Registered Nurse) on 04/29/25 at 0911      Medication Order Taking? Sig Documenting Provider Last Dose Status   albuterol 90 mcg/actuation inhaler [id number] Yes Inhale 2 puffs every 6 hours if needed for wheezing. Historical Provider, MD  Active   atorvastatin (Lipitor) 80 mg tablet [id number] Yes Take 1 tablet (80 mg) by mouth once daily. JAIR Barnes  Active   Autolet lancing device [id number] Yes Use once daily to monitor blood sugars JAIR Barnes  Active   blood glucose control, normal solution [id number] Yes Glucose control solution provides an easy way to ensure accurate blood glucose testing. Use to calibrate monthly, once a day, with every new strip bottle JAIR Barnes  Active   Blood glucose monitoring meter [id number] Yes Test daily before all meals/snacks and once before bedtime. JAIR Barnes  Active   blood sugar diagnostic [id number] Yes 1 strip once daily. JAIR Barnes  Active   budesonide-formoterol (Symbicort) 80-4.5 mcg/actuation inhaler [id number] Yes Inhale 2 puffs 2 times a day. Rinse mouth with water after use to reduce aftertaste and incidence of candidiasis. Do not  swallow. Historical Provider, MD  Active   ibandronate (Boniva) 150 mg tablet [id number] Yes Take 1 tablet (150 mg) by mouth every 30 (thirty) days. Take in morning with full glass of water on an empty stomach. No food, drink, meds, or lying down for 60 minutes after. JAIR Barnes  Active   isopropyl alcohoL 70 % towelette [id number] Yes Test daily before all meals/snacks and once before bedtime. JAIR Barnes  Active   lancets (Accu-Chek Softclix Lancets) misc [id number] Yes 1 each once daily. JAIR Barnes  Active   lancets 30 gauge misc [id number] Yes 1 Lancet once daily. Historical Provider, MD  Active   metoprolol succinate XL (Toprol-XL) 50 mg 24 hr tablet [id number] Yes Take 1 tablet (50 mg) by mouth early in the morning.. JAIR Barnes  Active   nitroglycerin (Nitrostat) 0.4 mg SL tablet [id number] Yes as directed Sublingual prn Historical Provider, MD  Active   QUEtiapine (SEROquel) 300 mg tablet [id number] Yes Take 1 tablet (300 mg) by mouth once daily at bedtime. JAIR Barnes  Active   sertraline (Zoloft) 100 mg tablet [id number] Yes Take 1 tablet (100 mg) by mouth once daily. JAIR Barnes  Active   tirzepatide (Mounjaro) 2.5 mg/0.5 mL pen injector [id number]  Inject 2.5 mg under the skin 1 (one) time per week.   Patient not taking: Reported on 4/29/2025    JAIR Barnes  Active                                 Medication compliance: Yes   Uses pill box/organizer: No    Carries medication list: No    Uses Inhalers appropriately:  will review    Blood Pressure Management  History of Hypertension: Yes   Medication Changes: No   Resting BP:  Visit Vitals  /78        Heart Failure Management  Hx of Heart Failure: No    Smoking/Tobacco Assessment  Tobacco Use History[1]    Other Core Component Plan  Goal Status: Initial Assessment; goals not yet started  Other Core Component Goals: Medication compliance and Achieve  resting BP of < 130/80 by discharge  Other Core Component Interventions/Education:           Individual Patient Goals:    Weight loss goal 10 lbs before discharge  Walk 15 minutes by discharge    Goal Status: Initial Assessment; goals not yet started    Staff Comments:      Rehab Staff Signature: Neena Ruelas RN             [1]   Social History  Tobacco Use   Smoking Status Former    Current packs/day: 0.00    Types: Cigarettes    Quit date:     Years since quittin.3    Passive exposure: Past   Smokeless Tobacco Never

## 2025-05-02 ENCOUNTER — CLINICAL SUPPORT (OUTPATIENT)
Dept: CARDIAC REHAB | Facility: CLINIC | Age: 70
End: 2025-05-02
Payer: MEDICARE

## 2025-05-02 DIAGNOSIS — J44.9 CHRONIC OBSTRUCTIVE PULMONARY DISEASE, UNSPECIFIED COPD TYPE (MULTI): ICD-10-CM

## 2025-05-02 PROCEDURE — 94626 PHY/QHP OP PULM RHB W/MNTR: CPT | Performed by: INTERNAL MEDICINE

## 2025-05-02 NOTE — PROGRESS NOTES
Pulmonary Rehab Education  Marilou BAÑUELOS Per   [id number]    5/2/2025  Patient attended Heart Healthy Diet lecture with program RDN during today's exercise session. Heart Healthy diet tips sheet was given for further review at home and patient was encouraged to come back with any follow up questions.     5/2/25 Oriented to exercise card and exercise equipment. Reviewed how to read pulse oximeter.    Signature Neena Ruelas RN

## 2025-05-05 ENCOUNTER — CLINICAL SUPPORT (OUTPATIENT)
Dept: CARDIAC REHAB | Facility: CLINIC | Age: 70
End: 2025-05-05
Payer: MEDICARE

## 2025-05-05 DIAGNOSIS — J44.9 CHRONIC OBSTRUCTIVE PULMONARY DISEASE, UNSPECIFIED COPD TYPE (MULTI): ICD-10-CM

## 2025-05-05 PROCEDURE — 94626 PHY/QHP OP PULM RHB W/MNTR: CPT | Performed by: INTERNAL MEDICINE

## 2025-05-06 ENCOUNTER — CLINICAL SUPPORT (OUTPATIENT)
Dept: CARDIAC REHAB | Facility: CLINIC | Age: 70
End: 2025-05-06
Payer: MEDICARE

## 2025-05-06 DIAGNOSIS — J44.9 CHRONIC OBSTRUCTIVE PULMONARY DISEASE, UNSPECIFIED COPD TYPE (MULTI): ICD-10-CM

## 2025-05-06 PROCEDURE — 94626 PHY/QHP OP PULM RHB W/MNTR: CPT | Performed by: INTERNAL MEDICINE

## 2025-05-09 ENCOUNTER — CLINICAL SUPPORT (OUTPATIENT)
Dept: CARDIAC REHAB | Facility: CLINIC | Age: 70
End: 2025-05-09
Payer: MEDICARE

## 2025-05-09 DIAGNOSIS — J44.9 CHRONIC OBSTRUCTIVE PULMONARY DISEASE, UNSPECIFIED COPD TYPE (MULTI): ICD-10-CM

## 2025-05-09 PROCEDURE — 94626 PHY/QHP OP PULM RHB W/MNTR: CPT | Performed by: INTERNAL MEDICINE

## 2025-05-09 NOTE — PROGRESS NOTES
Pulmonary Rehab Education  Marilou BAÑUELOS Per   [id number]    5/9/2025  Blood pressure education was done. How to read measurement numbers, ways to decrease, and risks of not managing blood pressure were discussed. Handouts were given for home reference and patient was encouraged to return with any questions.            Signature Neena Ruelas RN

## 2025-05-12 ENCOUNTER — CLINICAL SUPPORT (OUTPATIENT)
Dept: CARDIAC REHAB | Facility: CLINIC | Age: 70
End: 2025-05-12
Payer: MEDICARE

## 2025-05-12 DIAGNOSIS — J44.9 CHRONIC OBSTRUCTIVE PULMONARY DISEASE, UNSPECIFIED COPD TYPE (MULTI): ICD-10-CM

## 2025-05-12 PROCEDURE — 94626 PHY/QHP OP PULM RHB W/MNTR: CPT | Performed by: INTERNAL MEDICINE

## 2025-05-13 ENCOUNTER — CLINICAL SUPPORT (OUTPATIENT)
Dept: CARDIAC REHAB | Facility: CLINIC | Age: 70
End: 2025-05-13
Payer: MEDICARE

## 2025-05-13 DIAGNOSIS — J44.9 CHRONIC OBSTRUCTIVE PULMONARY DISEASE, UNSPECIFIED COPD TYPE (MULTI): ICD-10-CM

## 2025-05-13 PROCEDURE — 94626 PHY/QHP OP PULM RHB W/MNTR: CPT | Performed by: INTERNAL MEDICINE

## 2025-05-13 NOTE — PROGRESS NOTES
Pulmonary Rehab Education  Marilou BAÑUELOS Per   [id number]    5/13/2025  Education on cholesterol was done during today's session. Discussed with patient the different types of cholesterol, risk factors and how to make lifestyle modifications to improve levels. Handout and patient's recent lab results were given for home review.                Signature Neena Ruelas RN

## 2025-05-16 ENCOUNTER — CLINICAL SUPPORT (OUTPATIENT)
Dept: CARDIAC REHAB | Facility: CLINIC | Age: 70
End: 2025-05-16
Payer: MEDICARE

## 2025-05-16 DIAGNOSIS — J44.9 CHRONIC OBSTRUCTIVE PULMONARY DISEASE, UNSPECIFIED COPD TYPE (MULTI): ICD-10-CM

## 2025-05-16 PROCEDURE — 94626 PHY/QHP OP PULM RHB W/MNTR: CPT | Performed by: INTERNAL MEDICINE

## 2025-05-19 ENCOUNTER — APPOINTMENT (OUTPATIENT)
Dept: CARDIAC REHAB | Facility: CLINIC | Age: 70
End: 2025-05-19
Payer: MEDICARE

## 2025-05-20 ENCOUNTER — CLINICAL SUPPORT (OUTPATIENT)
Dept: CARDIAC REHAB | Facility: CLINIC | Age: 70
End: 2025-05-20
Payer: MEDICARE

## 2025-05-20 DIAGNOSIS — J44.9 CHRONIC OBSTRUCTIVE PULMONARY DISEASE, UNSPECIFIED COPD TYPE (MULTI): ICD-10-CM

## 2025-05-20 PROCEDURE — 94626 PHY/QHP OP PULM RHB W/MNTR: CPT | Performed by: INTERNAL MEDICINE

## 2025-05-20 NOTE — PROGRESS NOTES
Pulmonary Rehab Education  Marilou Albarado   [id number]    5/20/2025  Education completed on Energy Conservation. Handout given for home review.      Signature Neena Ruelas RN

## 2025-05-22 ENCOUNTER — OFFICE VISIT (OUTPATIENT)
Facility: CLINIC | Age: 70
End: 2025-05-22
Payer: MEDICARE

## 2025-05-22 ENCOUNTER — TELEPHONE (OUTPATIENT)
Facility: CLINIC | Age: 70
End: 2025-05-22

## 2025-05-22 VITALS
HEART RATE: 64 BPM | OXYGEN SATURATION: 96 % | WEIGHT: 208 LBS | SYSTOLIC BLOOD PRESSURE: 128 MMHG | RESPIRATION RATE: 16 BRPM | HEIGHT: 62 IN | BODY MASS INDEX: 38.28 KG/M2 | DIASTOLIC BLOOD PRESSURE: 70 MMHG

## 2025-05-22 DIAGNOSIS — J42 CHRONIC BRONCHITIS, UNSPECIFIED CHRONIC BRONCHITIS TYPE (MULTI): ICD-10-CM

## 2025-05-22 DIAGNOSIS — E11.65 TYPE 2 DIABETES MELLITUS WITH HYPERGLYCEMIA, WITHOUT LONG-TERM CURRENT USE OF INSULIN: ICD-10-CM

## 2025-05-22 DIAGNOSIS — I10 PRIMARY HYPERTENSION: ICD-10-CM

## 2025-05-22 DIAGNOSIS — E11.65 TYPE 2 DIABETES MELLITUS WITH HYPERGLYCEMIA, WITHOUT LONG-TERM CURRENT USE OF INSULIN: Primary | ICD-10-CM

## 2025-05-22 DIAGNOSIS — J44.9 CHRONIC OBSTRUCTIVE PULMONARY DISEASE, UNSPECIFIED COPD TYPE (MULTI): ICD-10-CM

## 2025-05-22 DIAGNOSIS — I25.10 CORONARY ARTERY DISEASE INVOLVING NATIVE HEART WITHOUT ANGINA PECTORIS, UNSPECIFIED VESSEL OR LESION TYPE: ICD-10-CM

## 2025-05-22 DIAGNOSIS — G47.33 OSA (OBSTRUCTIVE SLEEP APNEA): ICD-10-CM

## 2025-05-22 DIAGNOSIS — J44.89 COPD WITH ASTHMA (MULTI): ICD-10-CM

## 2025-05-22 DIAGNOSIS — E78.2 MIXED HYPERLIPIDEMIA: Primary | ICD-10-CM

## 2025-05-22 PROCEDURE — 1159F MED LIST DOCD IN RCRD: CPT | Performed by: INTERNAL MEDICINE

## 2025-05-22 PROCEDURE — 1160F RVW MEDS BY RX/DR IN RCRD: CPT | Performed by: INTERNAL MEDICINE

## 2025-05-22 PROCEDURE — G2211 COMPLEX E/M VISIT ADD ON: HCPCS | Performed by: INTERNAL MEDICINE

## 2025-05-22 PROCEDURE — 99214 OFFICE O/P EST MOD 30 MIN: CPT | Performed by: INTERNAL MEDICINE

## 2025-05-22 PROCEDURE — 3008F BODY MASS INDEX DOCD: CPT | Performed by: INTERNAL MEDICINE

## 2025-05-22 PROCEDURE — 1126F AMNT PAIN NOTED NONE PRSNT: CPT | Performed by: INTERNAL MEDICINE

## 2025-05-22 PROCEDURE — 3078F DIAST BP <80 MM HG: CPT | Performed by: INTERNAL MEDICINE

## 2025-05-22 PROCEDURE — 3074F SYST BP LT 130 MM HG: CPT | Performed by: INTERNAL MEDICINE

## 2025-05-22 PROCEDURE — 1036F TOBACCO NON-USER: CPT | Performed by: INTERNAL MEDICINE

## 2025-05-22 RX ORDER — SEMAGLUTIDE 1.34 MG/ML
0.5 INJECTION, SOLUTION SUBCUTANEOUS WEEKLY
Qty: 1 EACH | Refills: 11 | Status: SHIPPED | OUTPATIENT
Start: 2025-05-22

## 2025-05-22 RX ORDER — ATORVASTATIN CALCIUM 80 MG/1
80 TABLET, FILM COATED ORAL DAILY
Qty: 90 TABLET | Refills: 3 | Status: SHIPPED | OUTPATIENT
Start: 2025-05-22 | End: 2026-05-22

## 2025-05-22 RX ORDER — METOPROLOL SUCCINATE 50 MG/1
50 TABLET, EXTENDED RELEASE ORAL
Qty: 90 TABLET | Refills: 3 | Status: SHIPPED | OUTPATIENT
Start: 2025-05-22 | End: 2026-05-22

## 2025-05-22 RX ORDER — SEMAGLUTIDE 0.68 MG/ML
0.5 INJECTION, SOLUTION SUBCUTANEOUS WEEKLY
Qty: 3 ML | Refills: 11 | Status: SHIPPED | OUTPATIENT
Start: 2025-05-22

## 2025-05-22 RX ORDER — SEMAGLUTIDE 1.34 MG/ML
0.25 INJECTION, SOLUTION SUBCUTANEOUS WEEKLY
COMMUNITY
End: 2025-05-22 | Stop reason: SDUPTHER

## 2025-05-22 ASSESSMENT — LIFESTYLE VARIABLES
HOW MANY STANDARD DRINKS CONTAINING ALCOHOL DO YOU HAVE ON A TYPICAL DAY: PATIENT DOES NOT DRINK
AUDIT TOTAL SCORE: 0
SKIP TO QUESTIONS 9-10: 1
HOW OFTEN DO YOU HAVE SIX OR MORE DRINKS ON ONE OCCASION: NEVER
HAVE YOU OR SOMEONE ELSE BEEN INJURED AS A RESULT OF YOUR DRINKING: NO
AUDIT-C TOTAL SCORE: 0
HAS A RELATIVE, FRIEND, DOCTOR, OR ANOTHER HEALTH PROFESSIONAL EXPRESSED CONCERN ABOUT YOUR DRINKING OR SUGGESTED YOU CUT DOWN: NO
HOW OFTEN DO YOU HAVE A DRINK CONTAINING ALCOHOL: NEVER

## 2025-05-22 ASSESSMENT — PATIENT HEALTH QUESTIONNAIRE - PHQ9
2. FEELING DOWN, DEPRESSED OR HOPELESS: NOT AT ALL
SUM OF ALL RESPONSES TO PHQ9 QUESTIONS 1 AND 2: 0
1. LITTLE INTEREST OR PLEASURE IN DOING THINGS: NOT AT ALL

## 2025-05-22 ASSESSMENT — PAIN SCALES - GENERAL: PAINLEVEL_OUTOF10: 0-NO PAIN

## 2025-05-22 ASSESSMENT — ENCOUNTER SYMPTOMS
OCCASIONAL FEELINGS OF UNSTEADINESS: 0
DEPRESSION: 0
LOSS OF SENSATION IN FEET: 0

## 2025-05-22 NOTE — PROGRESS NOTES
[facility]        Subjective   Chief Complaint   Patient presents with    Follow-up     8 Month      69-year-old patient with history of COPD, past.  Patient was seen last time in September.  Patient has seen Dr. Boucher in the past.  Echocardiogram done 3 year ago essentially normal ejection fraction with diastolic dysfunction.  Currently on oxygen 3 L.  Negative nuclear stress test for ischemia about 2 years ago.  Patient here for routine evaluation.  Patient also underlying diabetes.  Currently on GLP.  CBC was done about a month ago was unremarkable except later, elevated.  Lipid profile done about 2 months ago triglyceride elevated 243 with diet LDL was 92.  Hemoglobin A1c was 6.6%.  Comprehensive metabolic panel done about 2 months ago was unremarkable.    She has no past medical history on file.  She has no past surgical history on file.   Family medical history includes dementia in father.  Current Outpatient Medications   Medication Sig Dispense Refill    albuterol 90 mcg/actuation inhaler Inhale 2 puffs every 6 hours if needed for wheezing.      atorvastatin (Lipitor) 80 mg tablet Take 1 tablet (80 mg) by mouth once daily. 90 tablet 1    Autolet lancing device Use once daily to monitor blood sugars 100 each 1    blood glucose control, normal solution Glucose control solution provides an easy way to ensure accurate blood glucose testing. Use to calibrate monthly, once a day, with every new strip bottle 1 each 0    Blood glucose monitoring meter Test daily before all meals/snacks and once before bedtime. 1 each 0    blood sugar diagnostic 1 strip once daily. 100 each 1    budesonide-formoterol (Symbicort) 80-4.5 mcg/actuation inhaler Inhale 2 puffs 2 times a day. Rinse mouth with water after use to reduce aftertaste and incidence of candidiasis. Do not swallow.      ibandronate (Boniva) 150 mg tablet Take 1 tablet (150 mg) by mouth every 30 (thirty) days.  Take in morning with full glass of water on an empty stomach. No food, drink, meds, or lying down for 60 minutes after. 3 tablet 3    isopropyl alcohoL 70 % towelette Test daily before all meals/snacks and once before bedtime. 1 each 0    lancets (Accu-Chek Softclix Lancets) misc 1 each once daily. 100 each 1    lancets 30 gauge misc 1 Lancet once daily.      metoprolol succinate XL (Toprol-XL) 50 mg 24 hr tablet Take 1 tablet (50 mg) by mouth early in the morning.. 90 tablet 1    nitroglycerin (Nitrostat) 0.4 mg SL tablet as directed Sublingual prn      QUEtiapine (SEROquel) 300 mg tablet Take 1 tablet (300 mg) by mouth once daily at bedtime. 90 tablet 1    semaglutide (Ozempic) 0.25 mg or 0.5 mg(2 mg/1.5 mL) pen injector Inject 0.25 mg under the skin 1 (one) time per week.      sertraline (Zoloft) 100 mg tablet Take 1 tablet (100 mg) by mouth once daily. 90 tablet 1     No current facility-administered medications for this visit.      reports that she quit smoking about 4 years ago. Her smoking use included cigarettes. She has never been exposed to tobacco smoke. She has never used smokeless tobacco. She reports that she does not drink alcohol and does not use drugs.  Allergies:  Penicillin    ROS: See HPI  CONSTITUTIONAL: Chills- none. Fever- none. Weight change appropriate for age.  HEENT: Headache- Negative.  Change in vision- none.  Ear pain- none. Nasal congestion- none. Post-nasal drip-none.  Sore throat-none.  CARDIOLOGY: Chest pain- none.  Leg edema-trace.  Murmurs-soft systolic.  Palpitation- none.  RESPIRATORY: Denies any shortness of breath.  GI: Abdominal pain- none.  Change in bowel habits- none.  Constipation- none.  Diarrhea- none.  Nausea- none.  Vomiting- none.  MUSCULOSKELETAL: Joint pain- none.  Muscle aches- none.  DERMATOLOGY: Rash- none.  NEUROLOGY: Dizziness- none.   Headache- none.  PSYCHIATRY: Denies any depression or anxiety     Vitals:    05/22/25 0836   BP: 128/70   Pulse: 64   Resp: 16  "  SpO2: 96%   Weight: 94.3 kg (208 lb)   Height: 1.575 m (5' 2\")   PainSc: 0-No pain      BMI:Body mass index is 38.04 kg/m².   General Cardiology:  General Appearance: Alert, oriented and in no acute distress.  HEENT: extra ocular movements intact (EOMI), pupils equal,  round, reactive to light and accommodation (PERRLA).  Carotid Upstroke: no bruit, normal.  Jugular Venous Distention (JVD): flat.  Chest: normal.  Lungs: Clear to auscultation,   Heart Sounds: no S3 or S4, normal S1, S2, regular rate.  Murmur, Click, Gallop: soft systolic murmur.  Abdomen: no hepatomegaly, no masses felt, soft.  Extremities: no leg edema.  Peripheral pulses: 2 plus bilateral.  NEUROLOGY Cranial nerves II-XII grossly intact.     Last Labs:  CMP:  Recent Labs     03/07/25  1054 05/10/24  1119 04/13/23  0937    140 140   K 5.2 4.8 5.1    105 104   CO2 29 24 25   ANIONGAP 9 11 11   BUN 18 15 24   CREATININE 0.75 0.90 0.9   EGFR 87 71 71   GLUCOSE 109* 114* 117*     Recent Labs     03/07/25  1054 05/10/24  1119 04/13/23  0937   ALBUMIN 4.2 4.1 4.0   ALKPHOS 103 108 126*   ALT 27 19 19   AST 22 17 18   BILITOT 0.3 0.2 0.2     CBC:  Recent Labs     03/07/25  1054 05/10/24  1119 04/13/23  0937   WBC 8.5 7.9 8.5   HGB 12.7 12.2 13.0   HCT 39.7 39.1 41.9   * 423 437   MCV 89.8 91 90.9     COAG: No results for input(s): \"INR\", \"DDIMERVTE\" in the last 07679 hours.  HEME/ENDO:  Recent Labs     03/07/25  1054 05/10/24  1119 04/13/23  0937 11/05/21  0813   TSH 1.24 1.14  --   --    HGBA1C 6.6*  --  6.0 5.9      CARDIAC: No results for input(s): \"LDH\", \"CKMB\", \"TROPHS\", \"BNP\" in the last 05753 hours.    No lab exists for component: \"CK\", \"CKMBP\"  Recent Labs     03/07/25  1054 05/10/24  1119 04/13/23  0937   CHOL 173 150 148   LDLCALC 92 59* 68   HDL 47* 43.0* 43*   TRIG 243* 239* 184*       Last Cardiology Tests:  Echo:  Echo Results:  No results found for this or any previous visit from the past 3650 days.     Cath:  Stress " Test:  Stress Results:  No results found for this or any previous visit from the past 365 days.     Cardiac Imaging:    Problem List Items Addressed This Visit       Mixed hyperlipidemia - Primary    ANIVAL (obstructive sleep apnea)    Type 2 diabetes mellitus with hyperglycemia, without long-term current use of insulin      69-year-old female patient with a history of diabetes type 2 hyperglycemia with elevated hemoglobin A1c.  Last A1c was about 6.6%.  1.  Hypertension: Continue current Toprol as well as diet controlled.  2.  Hyperlipidemia: Continue current atorvastatin 80 mg tablet p.o. daily.  3.  Diabetes type 2: Patient globin A1c is about 6.6% goal is to drop below 5.7%.  Continue GLP-1 discussed patient about the benefits risk alternatives.  Refill prescription sent    Advised patient to avoid lunch meats, canned soups, pizzas, bread rolls, and sandwiches. Advised patient to limit salt intake 1,500 mg daily. Advised patient to exercise 30 mins/3 times a week including treadmill or aerobic type, Goal to achieve 65% target HR.    Diet and exercise reviewed with patient..advice to walk about 10,000 steps or about 2 hours during day time. Cut back on salt, sugar and flour.    Pt. care time is spent includes review of diagnostic tests, labs, radiographs, EKGs, old echoes, cardiac work-up and coordination of care. Assessment, impression and plans are reflected in the note above as well as the orders.    Bishop Sanchez MD  [facility]  [facility]

## 2025-05-22 NOTE — TELEPHONE ENCOUNTER
[facility] called, stated that ozempic is not dispensed in 2 mg/1.5mL. It should be ordered as 2 mg/3mL. Disp amount should also be 3 as 3 pens to each box.

## 2025-05-23 ENCOUNTER — CLINICAL SUPPORT (OUTPATIENT)
Dept: CARDIAC REHAB | Facility: CLINIC | Age: 70
End: 2025-05-23
Payer: MEDICARE

## 2025-05-23 ENCOUNTER — DOCUMENTATION (OUTPATIENT)
Dept: CARDIAC REHAB | Facility: CLINIC | Age: 70
End: 2025-05-23

## 2025-05-23 DIAGNOSIS — J44.9 CHRONIC OBSTRUCTIVE PULMONARY DISEASE, UNSPECIFIED COPD TYPE (MULTI): ICD-10-CM

## 2025-05-23 PROCEDURE — 94626 PHY/QHP OP PULM RHB W/MNTR: CPT | Performed by: INTERNAL MEDICINE

## 2025-05-27 ENCOUNTER — CLINICAL SUPPORT (OUTPATIENT)
Dept: CARDIAC REHAB | Facility: CLINIC | Age: 70
End: 2025-05-27
Payer: MEDICARE

## 2025-05-27 ENCOUNTER — TELEPHONE (OUTPATIENT)
Facility: CLINIC | Age: 70
End: 2025-05-27

## 2025-05-27 DIAGNOSIS — J44.9 CHRONIC OBSTRUCTIVE PULMONARY DISEASE, UNSPECIFIED COPD TYPE (MULTI): ICD-10-CM

## 2025-05-27 PROCEDURE — 94626 PHY/QHP OP PULM RHB W/MNTR: CPT | Performed by: INTERNAL MEDICINE

## 2025-05-29 ENCOUNTER — TELEPHONE (OUTPATIENT)
Dept: CARDIOLOGY | Facility: CLINIC | Age: 70
End: 2025-05-29
Payer: MEDICARE

## 2025-05-30 ENCOUNTER — CLINICAL SUPPORT (OUTPATIENT)
Dept: CARDIAC REHAB | Facility: CLINIC | Age: 70
End: 2025-05-30
Payer: MEDICARE

## 2025-05-30 DIAGNOSIS — J44.9 CHRONIC OBSTRUCTIVE PULMONARY DISEASE, UNSPECIFIED COPD TYPE (MULTI): ICD-10-CM

## 2025-05-30 PROCEDURE — 94626 PHY/QHP OP PULM RHB W/MNTR: CPT | Performed by: INTERNAL MEDICINE

## 2025-05-30 NOTE — PROGRESS NOTES
Pulmonary Rehab Education  Marilou Albarado   [id number]    5/30/2025  Education done on dining out. Dining out handout given for further review at home. Encouraged to follow up with questions as needed     Signature Neena Ruelas RN

## 2025-06-02 ENCOUNTER — APPOINTMENT (OUTPATIENT)
Dept: CARDIAC REHAB | Facility: CLINIC | Age: 70
End: 2025-06-02
Payer: MEDICARE

## 2025-06-02 DIAGNOSIS — J44.9 CHRONIC OBSTRUCTIVE PULMONARY DISEASE, UNSPECIFIED COPD TYPE (MULTI): ICD-10-CM

## 2025-06-02 PROCEDURE — 94626 PHY/QHP OP PULM RHB W/MNTR: CPT | Performed by: INTERNAL MEDICINE

## 2025-06-03 ENCOUNTER — APPOINTMENT (OUTPATIENT)
Dept: CARDIAC REHAB | Facility: CLINIC | Age: 70
End: 2025-06-03
Payer: MEDICARE

## 2025-06-03 DIAGNOSIS — J44.9 CHRONIC OBSTRUCTIVE PULMONARY DISEASE, UNSPECIFIED COPD TYPE (MULTI): ICD-10-CM

## 2025-06-03 PROCEDURE — 94626 PHY/QHP OP PULM RHB W/MNTR: CPT | Performed by: INTERNAL MEDICINE

## 2025-06-06 ENCOUNTER — APPOINTMENT (OUTPATIENT)
Dept: CARDIAC REHAB | Facility: CLINIC | Age: 70
End: 2025-06-06
Payer: MEDICARE

## 2025-06-06 DIAGNOSIS — J44.9 CHRONIC OBSTRUCTIVE PULMONARY DISEASE, UNSPECIFIED COPD TYPE (MULTI): ICD-10-CM

## 2025-06-09 ENCOUNTER — APPOINTMENT (OUTPATIENT)
Dept: CARDIAC REHAB | Facility: CLINIC | Age: 70
End: 2025-06-09
Payer: MEDICARE

## 2025-06-10 ENCOUNTER — APPOINTMENT (OUTPATIENT)
Dept: CARDIAC REHAB | Facility: CLINIC | Age: 70
End: 2025-06-10
Payer: MEDICARE

## 2025-06-10 ENCOUNTER — CLINICAL SUPPORT (OUTPATIENT)
Dept: SLEEP MEDICINE | Facility: HOSPITAL | Age: 70
End: 2025-06-10
Payer: MEDICARE

## 2025-06-10 DIAGNOSIS — G47.33 OBSTRUCTIVE SLEEP APNEA (ADULT) (PEDIATRIC): ICD-10-CM

## 2025-06-10 DIAGNOSIS — G47.61 PERIODIC LIMB MOVEMENT DISORDER: ICD-10-CM

## 2025-06-10 DIAGNOSIS — J44.9 CHRONIC OBSTRUCTIVE PULMONARY DISEASE, UNSPECIFIED COPD TYPE (MULTI): ICD-10-CM

## 2025-06-10 PROCEDURE — 94626 PHY/QHP OP PULM RHB W/MNTR: CPT | Performed by: INTERNAL MEDICINE

## 2025-06-10 PROCEDURE — 95810 POLYSOM 6/> YRS 4/> PARAM: CPT | Performed by: INTERNAL MEDICINE

## 2025-06-11 VITALS
WEIGHT: 200.62 LBS | HEART RATE: 60 BPM | BODY MASS INDEX: 36.92 KG/M2 | OXYGEN SATURATION: 90 % | SYSTOLIC BLOOD PRESSURE: 142 MMHG | HEIGHT: 62 IN | DIASTOLIC BLOOD PRESSURE: 90 MMHG | RESPIRATION RATE: 20 BRPM

## 2025-06-11 ASSESSMENT — SLEEP AND FATIGUE QUESTIONNAIRES
HOW LIKELY ARE YOU TO NOD OFF OR FALL ASLEEP WHILE LYING DOWN TO REST IN THE AFTERNOON WHEN CIRCUMSTANCES PERMIT: WOULD NEVER DOZE
HOW LIKELY ARE YOU TO NOD OFF OR FALL ASLEEP WHILE SITTING AND READING: WOULD NEVER DOZE
HOW LIKELY ARE YOU TO NOD OFF OR FALL ASLEEP WHILE SITTING AND TALKING TO SOMEONE: WOULD NEVER DOZE
HOW LIKELY ARE YOU TO NOD OFF OR FALL ASLEEP IN A CAR, WHILE STOPPED FOR A FEW MINUTES IN TRAFFIC: WOULD NEVER DOZE
HOW LIKELY ARE YOU TO NOD OFF OR FALL ASLEEP WHILE WATCHING TV: WOULD NEVER DOZE
HOW LIKELY ARE YOU TO NOD OFF OR FALL ASLEEP WHEN YOU ARE A PASSENGER IN A CAR FOR AN HOUR WITHOUT A BREAK: WOULD NEVER DOZE
HOW LIKELY ARE YOU TO NOD OFF OR FALL ASLEEP WHILE SITTING QUIETLY AFTER LUNCH WITHOUT ALCOHOL: WOULD NEVER DOZE
ESS-CHAD TOTAL SCORE: 0
SITING INACTIVE IN A PUBLIC PLACE LIKE A CLASS ROOM OR A MOVIE THEATER: WOULD NEVER DOZE

## 2025-06-11 NOTE — PROGRESS NOTES
[facility] TECH NOTE:     Patient: Marilou Albarado   MRN//AGE: [id number]  1955  69 y.o.   Technologist: Paola Mckinnon   Room: 4   Service Date: 06/10/2025        Sleep Testing Location: [facility]  Neck: 36.5 cm  Juana Diaz: 0    TECHNOLOGIST SLEEP STUDY PROCEDURE NOTE:   This sleep study is being conducted according to the policies and procedures outlined by the AAS accreditation standards. The sleep study procedure and processes involved during this appointment were explained to the patient/patient’s family, questions were answered. The patient/family verbalized understanding.      The patient is a 69 y.o. female scheduled for a diagnostic PSG with EtCO2 monitoring.    The study that was ultimately completed was a diagnostic PSG with EtCO2 monitoring.    The full study was completed.  Patient questionnaires completed?: yes   Consents signed? yes    Initial Fall Risk Screening:     Marilou has not fallen in the last 6 months. Marilou does not have a fear of falling. She does not need assistance with sitting, standing, or walking. The patient is not using an assistive device.     Brief Study observations: Split criteria was not met.       After the procedure, the patient/family was informed to ensure followup with ordering clinician for testing results.      Technologist: DASH Pepper

## 2025-06-13 ENCOUNTER — APPOINTMENT (OUTPATIENT)
Dept: CARDIAC REHAB | Facility: CLINIC | Age: 70
End: 2025-06-13
Payer: MEDICARE

## 2025-06-13 DIAGNOSIS — J44.9 CHRONIC OBSTRUCTIVE PULMONARY DISEASE, UNSPECIFIED COPD TYPE (MULTI): ICD-10-CM

## 2025-06-13 PROCEDURE — 94626 PHY/QHP OP PULM RHB W/MNTR: CPT | Performed by: INTERNAL MEDICINE

## 2025-06-13 NOTE — PROGRESS NOTES
Pulmonary Rehab Education  Marilou Albarado   [id number]    6/13/2025  Education on portion sizes was done during today's session. Gave visual demonstration of portions. Handouts were given for home reference and review.     Signature Neena Ruelas RN

## 2025-06-16 ENCOUNTER — DOCUMENTATION (OUTPATIENT)
Dept: CARDIAC REHAB | Facility: CLINIC | Age: 70
End: 2025-06-16

## 2025-06-16 ENCOUNTER — APPOINTMENT (OUTPATIENT)
Dept: CARDIAC REHAB | Facility: CLINIC | Age: 70
End: 2025-06-16
Payer: MEDICARE

## 2025-06-16 DIAGNOSIS — J44.9 CHRONIC OBSTRUCTIVE PULMONARY DISEASE, UNSPECIFIED COPD TYPE (MULTI): ICD-10-CM

## 2025-06-16 PROCEDURE — 94626 PHY/QHP OP PULM RHB W/MNTR: CPT | Performed by: INTERNAL MEDICINE

## 2025-06-17 ENCOUNTER — APPOINTMENT (OUTPATIENT)
Dept: CARDIAC REHAB | Facility: CLINIC | Age: 70
End: 2025-06-17
Payer: MEDICARE

## 2025-06-17 DIAGNOSIS — J44.9 CHRONIC OBSTRUCTIVE PULMONARY DISEASE, UNSPECIFIED COPD TYPE (MULTI): ICD-10-CM

## 2025-06-17 PROCEDURE — 94626 PHY/QHP OP PULM RHB W/MNTR: CPT | Performed by: INTERNAL MEDICINE

## 2025-06-20 ENCOUNTER — APPOINTMENT (OUTPATIENT)
Dept: CARDIAC REHAB | Facility: CLINIC | Age: 70
End: 2025-06-20
Payer: MEDICARE

## 2025-06-20 DIAGNOSIS — J44.9 CHRONIC OBSTRUCTIVE PULMONARY DISEASE, UNSPECIFIED COPD TYPE (MULTI): ICD-10-CM

## 2025-06-23 ENCOUNTER — APPOINTMENT (OUTPATIENT)
Dept: CARDIAC REHAB | Facility: CLINIC | Age: 70
End: 2025-06-23
Payer: MEDICARE

## 2025-06-23 DIAGNOSIS — J44.9 CHRONIC OBSTRUCTIVE PULMONARY DISEASE, UNSPECIFIED COPD TYPE (MULTI): ICD-10-CM

## 2025-06-23 PROCEDURE — 94626 PHY/QHP OP PULM RHB W/MNTR: CPT | Performed by: INTERNAL MEDICINE

## 2025-06-24 ENCOUNTER — APPOINTMENT (OUTPATIENT)
Dept: CARDIAC REHAB | Facility: CLINIC | Age: 70
End: 2025-06-24
Payer: MEDICARE

## 2025-06-24 DIAGNOSIS — J44.9 CHRONIC OBSTRUCTIVE PULMONARY DISEASE, UNSPECIFIED COPD TYPE (MULTI): ICD-10-CM

## 2025-06-24 PROCEDURE — 94626 PHY/QHP OP PULM RHB W/MNTR: CPT | Performed by: INTERNAL MEDICINE

## 2025-06-27 ENCOUNTER — APPOINTMENT (OUTPATIENT)
Dept: CARDIAC REHAB | Facility: CLINIC | Age: 70
End: 2025-06-27
Payer: MEDICARE

## 2025-06-27 DIAGNOSIS — J44.9 CHRONIC OBSTRUCTIVE PULMONARY DISEASE, UNSPECIFIED COPD TYPE (MULTI): ICD-10-CM

## 2025-06-27 PROCEDURE — 94626 PHY/QHP OP PULM RHB W/MNTR: CPT | Performed by: INTERNAL MEDICINE

## 2025-06-27 NOTE — PROGRESS NOTES
Pulmonary Rehab Education  Marilou BAÑUELOS Per   [id number]    6/27/2025  Gave [facility] Living Well With COPD booklet and reviewed resources for pulmonary rehabilitation and exercise. Introduced pursed lip breathing and diaphragmatic breathing during exercise session. Reviewed the benefits of breathing exercise for oxygenation and control with exertion. Practiced with patient and she was able to review back. With booklet, reviewed COPD action plan as well with patient.      Signature Neena Ruelas RN

## 2025-06-30 ENCOUNTER — APPOINTMENT (OUTPATIENT)
Dept: CARDIAC REHAB | Facility: CLINIC | Age: 70
End: 2025-06-30
Payer: MEDICARE

## 2025-07-01 ENCOUNTER — APPOINTMENT (OUTPATIENT)
Dept: CARDIAC REHAB | Facility: CLINIC | Age: 70
End: 2025-07-01
Payer: MEDICARE

## 2025-07-01 DIAGNOSIS — J44.9 CHRONIC OBSTRUCTIVE PULMONARY DISEASE, UNSPECIFIED COPD TYPE (MULTI): ICD-10-CM

## 2025-07-01 PROCEDURE — 94626 PHY/QHP OP PULM RHB W/MNTR: CPT | Performed by: INTERNAL MEDICINE

## 2025-07-07 ENCOUNTER — APPOINTMENT (OUTPATIENT)
Dept: CARDIAC REHAB | Facility: CLINIC | Age: 70
End: 2025-07-07
Payer: MEDICARE

## 2025-07-07 DIAGNOSIS — J44.9 CHRONIC OBSTRUCTIVE PULMONARY DISEASE, UNSPECIFIED COPD TYPE (MULTI): ICD-10-CM

## 2025-07-07 PROCEDURE — 94626 PHY/QHP OP PULM RHB W/MNTR: CPT | Performed by: INTERNAL MEDICINE

## 2025-07-08 ENCOUNTER — APPOINTMENT (OUTPATIENT)
Dept: CARDIAC REHAB | Facility: CLINIC | Age: 70
End: 2025-07-08
Payer: MEDICARE

## 2025-07-08 DIAGNOSIS — J44.9 CHRONIC OBSTRUCTIVE PULMONARY DISEASE, UNSPECIFIED COPD TYPE (MULTI): ICD-10-CM

## 2025-07-08 PROCEDURE — 94626 PHY/QHP OP PULM RHB W/MNTR: CPT | Performed by: INTERNAL MEDICINE

## 2025-07-08 NOTE — PROGRESS NOTES
Pulmonary Rehab Education  Marilou Albarado   [id number]    7/8/2025  Infection prevention discussed. Handout given for home review.    Signature Neena Ruelas RN

## 2025-07-11 ENCOUNTER — APPOINTMENT (OUTPATIENT)
Dept: CARDIAC REHAB | Facility: CLINIC | Age: 70
End: 2025-07-11
Payer: MEDICARE

## 2025-07-11 ENCOUNTER — DOCUMENTATION (OUTPATIENT)
Dept: CARDIAC REHAB | Facility: CLINIC | Age: 70
End: 2025-07-11

## 2025-07-11 NOTE — PROGRESS NOTES
Pulmonary Rehabilitation 90 Day Progress Report    Name: Marilou Albarado  Medical Record Number: [id number]  YOB: 1955  Age: 69 y.o.    Today’s Date: 7/11/2025  Primary Care Physician: LIS Barnes-CNP  Referring Physician: Jenna Nieto, APR*/Perla Soriano MD  Program Location: [address]  Primary Diagnosis: COPD   Onset/Date of Diagnosis: approx 2018  Session #:  23  AACVPR Risk Stratification: moderate  Falls Risk: Low  Psychosocial Assessment  Pre PHQ-9: 6  Sent PH-Q 9 to MD if score > 20: No; score < 20    Stress Management    Pt reported/currently experiencing stress: Yes; Stress; Severity: severe  Patient uses stress management skills: No   History of: depression  Currently seeing a mental health provider: No  Social Support: Limited support, daughter in [place]  Pre CAT score: 15   Post CAT score: Survey to be given at discharge.   Learning Assessment:  Learning assessment/barriers: None  Preferred learning method: Visual, Reading handout, and Writing handout  Barriers: None  Comments:  Stages of Change: Action    Psychosocial Plan  Goal Status: In Progress  Psychosocial Goals: Demonstrating proper techniques for stress management, Maintain or lower PH-Q 9 score by discharge, Identify strategies for managing depression, and Identify social supports  Psychosocial Interventions/Education:   4/29/25 reviewed initial PHQ-9 score w/ pt at [facility]/   6/13/25 Encouraged pt to attend stress lecture. No issues voiced to staff/  6/27/25 Effects of stress on the body, risks of unmanaged stress and techniques to minimize anxieties were all discussed in today's session. Handout was given for reference with resources to  [facility] should patient need further assistance with stress management. Patient encouraged to notify staff if stressors increase and additional help is required./mk  Oxygen Assessment    SpO2 at rest: 97 %  SpO2 with exertion: 94  "%    Oxygen Use    Supplemental O2 prescribed: Yes,   Liters at rest: 2-3 L  Liters with exertion: 2-3 L    SpO2 at rest: 97 %  SpO2 with exertion: 94 %    Using O2 as prescribed: Yes  DME: [facility]      Demonstrates appropriate knowledge of O2 use: Yes   Demonstrates appropriate knowledge of O2 safety: Yes     Home O2 System: Concentrator  Portable O2 System: Portable Tank    Hypoxia managed: Yes   Home Pulse Oximeter: No     Pre MMRC: 3  Post MMRC:     Oxygen Plan  Goal Status: In Progress  Oxygen Goals: Decrease MMRC score, Learn and use pursed lip breathing as needed, and Titrate supplemental O2 as needed to keep SpO2 at 88% or greater  Oxygen Education/Interventions:   5/20/2025  Education completed on Energy Conservation. Handout given for home review.Signature Neena Ruelas RN  6/10/25 PLB technique noted./mk  6/27/2025  Gave [facility] With COPD booklet and reviewed resources for pulmonary rehabilitation and exercise. Introduced pursed lip breathing and diaphragmatic breathing during exercise session. Reviewed the benefits of breathing exercise for oxygenation and control with exertion. Practiced with patient and she was able to review back. With booklet, reviewed COPD action plan as well with patient.  Signature Neena Ruelas RN  Nutrition Assessment:    Hyperlipidemia: Yes     Lipids:   Lab Results   Component Value Date    CHOL 173 03/07/2025    HDL 47 (L) 03/07/2025    TRIG 243 (H) 03/07/2025       Current Dietary Guidelines: no special diet  Barriers to dietary change: no    Diet Habit Survey: Picture Your Plate  Pre: Survey not yet returned by patient.  Post: To be done at discharge.    Diabetes Assessment    Lab Results   Component Value Date    HGBA1C 6.6 (H) 03/07/2025       History of Diabetes: Yes Pt monitors BS at home: Yes   Frequency: 1 /day  FBS range: 116   Hypoglycemic Episodes: No     Weight Management    Height: 157.5 cm (5' 2\")  Weight: 202lb  BMI (Calculated): " 37.49      Nutrition Plan    Goal Status: In Progress  Nutrition Goals: Improve Diet Habit Survey score by 5-10 points by discharge, Learn how to read and interpret nutrition labels prior to discharge, and Lose 1lb/week while enrolled in program  Nutrition Interventions/Education:   5/2/2025  Patient attended Heart Healthy Diet lecture with program RDN during today's exercise session. Heart Healthy diet tips sheet was given for further review at home and patient was encouraged to come back with any follow up questions/MK  5/13/2025  Education on cholesterol was done during today's session. Discussed with patient the different types of cholesterol, risk factors and how to make lifestyle modifications to improve levels. Handout and patient's recent lab results were given for home review. Juan Ruelas RN  5/30/2025  Education done on dining out. Dining out handout given for further review at home. Encouraged to follow up with questions as needed Juan Ruelas RN  6/13/2025  Education on portion sizes was done during today's session. Gave visual demonstration of portions. Handouts were given for home reference and review. Signature Neena Ruelas RN        Exercise Assessment    Home exercise:  No  Mode: NA  Frequency: NA  Duration: NA    6 Minute Walk Assessment     6 MWT distance:  1060 ft  FiO2 used during test: 3 Liters    Exercise Prescription      Exercise Prescription based on: current ex rx       Frequency:  3 days/week   Mode: Treadmill, NuStep, and Recumbent Cycle   Duration: 30 total aerobic minutes   Intensity: RPE 12-14  Target HR:  RPE/RPD  MET Level: 2.4  Patient wears supplemental O2: Yes;   O2 Flow Rate: 2 to 3 L/min  SpO2 Range: 88 to 98 %     Modality Workload METs Duration (minutes)   1 Pre-Exercise      2 Treadmill 1.6 mph @ 1% 2.3 12 :00   3 NuStep Load 5  54 casiano  2.4 12 :00   4 Recumbent Bike Load 4 @ 55 rpm 1.8 12 :00   5 Tom Valentinene 0.8 Load  2.2 12 :00   6  Post-Exercise        Resistance Training: No   Home Exercise Prescription given: To be given prior to discharge from program.    Exercise Plan  Goal Status: In Progress  Exercise Goals: Increase exercise MET level by 5-10% each week, Increase total exercise duration to 30-45 minutes, Initiate strength training 2-3 days a week, and Establish a home exercise program before discharge  Exercise Interventions/Education:   5/2/25 Oriented to exercise card and exercise equipment. Reviewed how to read pulse oximeter.Signature Neena Ruelas RN  7/8/25 Discussed exercising at fitness center. Pt instructed to use continuous oxygen and monitor saturations on  pulse ox. Encouraged to keep saturations greater than 88% with exercise. Verbalized understanding./mk    Other Core Components/Risk Factor Assessment:    Medication adherence  Current Medications:   Medication Documentation Review Audit       Reviewed by Bishop Sanchez MD (Physician) on 05/22/25 at 0914      Medication Order Taking? Sig Documenting Provider Last Dose Status   albuterol 90 mcg/actuation inhaler [id number] Yes Inhale 2 puffs every 6 hours if needed for wheezing. Historical Provider, MD  Active   atorvastatin (Lipitor) 80 mg tablet [id number] Yes Take 1 tablet (80 mg) by mouth once daily. JAIR Barnes  Active   Autolet lancing device [id number] Yes Use once daily to monitor blood sugars JAIR Barnes  Active   blood glucose control, normal solution [id number] Yes Glucose control solution provides an easy way to ensure accurate blood glucose testing. Use to calibrate monthly, once a day, with every new strip bottle JAIR Barnes  Active   Blood glucose monitoring meter [id number] Yes Test daily before all meals/snacks and once before bedtime. JAIR Barnes  Active   blood sugar diagnostic [id number] Yes 1 strip once daily. JAIR Barnes  Active   budesonide-formoterol (Symbicort) 80-4.5  mcg/actuation inhaler [id number] Yes Inhale 2 puffs 2 times a day. Rinse mouth with water after use to reduce aftertaste and incidence of candidiasis. Do not swallow. Historical Provider, MD  Active   ibandronate (Boniva) 150 mg tablet [id number] Yes Take 1 tablet (150 mg) by mouth every 30 (thirty) days. Take in morning with full glass of water on an empty stomach. No food, drink, meds, or lying down for 60 minutes after. JAIR Barnes  Active   isopropyl alcohoL 70 % towelette [id number] Yes Test daily before all meals/snacks and once before bedtime. JAIR Barnes  Active   lancets (Accu-Chek Softclix Lancets) misc [id number] Yes 1 each once daily. JAIR Barnes  Active   lancets 30 gauge misc [id number] Yes 1 Lancet once daily. Historical Provider, MD  Active   metoprolol succinate XL (Toprol-XL) 50 mg 24 hr tablet [id number] Yes Take 1 tablet (50 mg) by mouth early in the morning.. JAIR Barnes  Active   nitroglycerin (Nitrostat) 0.4 mg SL tablet [id number] Yes as directed Sublingual prn Historical Provider, MD  Active   QUEtiapine (SEROquel) 300 mg tablet [id number] Yes Take 1 tablet (300 mg) by mouth once daily at bedtime. JAIR Barnes  Active   semaglutide (Ozempic) 0.25 mg or 0.5 mg(2 mg/1.5 mL) pen injector [id number] Yes Inject 0.25 mg under the skin 1 (one) time per week. Historical Provider, MD  Active   sertraline (Zoloft) 100 mg tablet [id number] Yes Take 1 tablet (100 mg) by mouth once daily. JAIR aBrnes  Active    Patient not taking:   Discontinued 05/22/25 0909                                 Medication compliance: Yes   Uses pill box/organizer: No    Carries medication list: No    Uses Inhalers appropriately: Yes    Blood Pressure Management  History of Hypertension: Yes   Medication Changes: No   Resting BP:  Visit Vitals  BP: WNL     Heart Failure Management  Hx of Heart Failure: No    Smoking/Tobacco  Assessment  Tobacco Use History[1]    Other Core Component Plan  Goal Status: In Progress  Other Core Component Goals: Medication compliance and Achieve resting BP of < 130/80 by discharge  Other Core Component Interventions/Education:   2025  Blood pressure education was done. How to read measurement numbers, ways to decrease, and risks of not managing blood pressure were discussed. Handouts were given for home reference and patient was encouraged to return with any questions.    Juan Ruelas RN  25 reviewed controlled coughing technique to help with sputum production/MS  2025  Infection prevention discussed. Handout given for home review.Signature Neena Ruelas RN    Individual Patient Goals:    Weight loss goal 10 lbs before discharge-Met  Walk 15 minutes by discharge    Goal Status: In Progress    Staff Comments:  Ms Albarado has been adherent with her pulmonary rehab classes. Continues to utilized 3L N/C to keep oxygen saturations greater than 88%. Struggling with increased workloads, Interval training encouraged and helpful. Discussed weather and the effects on breathing. Carries rescue inhaler and verbalizes correct usage.                [1]   Social History  Tobacco Use   Smoking Status Former    Current packs/day: 0.00    Types: Cigarettes    Quit date:     Years since quittin.5    Passive exposure: Never   Smokeless Tobacco Never

## 2025-07-14 ENCOUNTER — APPOINTMENT (OUTPATIENT)
Dept: CARDIAC REHAB | Facility: CLINIC | Age: 70
End: 2025-07-14
Payer: MEDICARE

## 2025-07-14 DIAGNOSIS — J44.9 CHRONIC OBSTRUCTIVE PULMONARY DISEASE, UNSPECIFIED COPD TYPE (MULTI): ICD-10-CM

## 2025-07-14 PROCEDURE — 94626 PHY/QHP OP PULM RHB W/MNTR: CPT | Performed by: INTERNAL MEDICINE

## 2025-07-15 ENCOUNTER — APPOINTMENT (OUTPATIENT)
Dept: CARDIAC REHAB | Facility: CLINIC | Age: 70
End: 2025-07-15
Payer: MEDICARE

## 2025-07-15 DIAGNOSIS — J44.9 CHRONIC OBSTRUCTIVE PULMONARY DISEASE, UNSPECIFIED COPD TYPE (MULTI): ICD-10-CM

## 2025-07-15 PROCEDURE — 94626 PHY/QHP OP PULM RHB W/MNTR: CPT | Performed by: INTERNAL MEDICINE

## 2025-07-15 NOTE — PROGRESS NOTES
Pulmonary Rehab Education  Marilou Albarado   [id number]    7/15/2025  Instructed on upper body resistance training. Will review.    Signature Neena Ruelas RN

## 2025-07-18 ENCOUNTER — APPOINTMENT (OUTPATIENT)
Dept: CARDIAC REHAB | Facility: CLINIC | Age: 70
End: 2025-07-18
Payer: MEDICARE

## 2025-07-18 DIAGNOSIS — J44.9 CHRONIC OBSTRUCTIVE PULMONARY DISEASE, UNSPECIFIED COPD TYPE (MULTI): ICD-10-CM

## 2025-07-18 PROCEDURE — 94626 PHY/QHP OP PULM RHB W/MNTR: CPT | Performed by: INTERNAL MEDICINE

## 2025-07-18 NOTE — PROGRESS NOTES
Cardiac Rehab Education  Marilou BAÑUELOS Per   [id number]    7/18/2025  Education on sodium intake was done. Discussed with patient the risks of excess levels of sodium and how to decrease dietary intake with provided list of substitutions. Handouts were given for home reference.            Signature Neena Ruelas RN

## 2025-07-21 ENCOUNTER — APPOINTMENT (OUTPATIENT)
Dept: CARDIAC REHAB | Facility: CLINIC | Age: 70
End: 2025-07-21
Payer: MEDICARE

## 2025-07-21 DIAGNOSIS — J44.9 CHRONIC OBSTRUCTIVE PULMONARY DISEASE, UNSPECIFIED COPD TYPE (MULTI): ICD-10-CM

## 2025-07-21 PROCEDURE — 94626 PHY/QHP OP PULM RHB W/MNTR: CPT | Performed by: INTERNAL MEDICINE

## 2025-07-22 ENCOUNTER — CLINICAL SUPPORT (OUTPATIENT)
Dept: CARDIAC REHAB | Facility: CLINIC | Age: 70
End: 2025-07-22
Payer: MEDICARE

## 2025-07-22 ENCOUNTER — APPOINTMENT (OUTPATIENT)
Dept: CARDIAC REHAB | Facility: CLINIC | Age: 70
End: 2025-07-22
Payer: MEDICARE

## 2025-07-22 DIAGNOSIS — J44.9 CHRONIC OBSTRUCTIVE PULMONARY DISEASE, UNSPECIFIED COPD TYPE (MULTI): ICD-10-CM

## 2025-07-22 PROCEDURE — 94626 PHY/QHP OP PULM RHB W/MNTR: CPT | Performed by: INTERNAL MEDICINE

## 2025-07-25 ENCOUNTER — APPOINTMENT (OUTPATIENT)
Dept: CARDIAC REHAB | Facility: CLINIC | Age: 70
End: 2025-07-25
Payer: MEDICARE

## 2025-07-28 ENCOUNTER — CLINICAL SUPPORT (OUTPATIENT)
Dept: CARDIAC REHAB | Facility: CLINIC | Age: 70
End: 2025-07-28
Payer: MEDICARE

## 2025-07-28 DIAGNOSIS — J44.9 CHRONIC OBSTRUCTIVE PULMONARY DISEASE, UNSPECIFIED COPD TYPE (MULTI): ICD-10-CM

## 2025-07-28 PROCEDURE — 94626 PHY/QHP OP PULM RHB W/MNTR: CPT | Performed by: INTERNAL MEDICINE

## 2025-07-28 NOTE — PROGRESS NOTES
Pulmonary Rehab Education  Marilou Albarado   [id number]    7/28/2025  Discussed PLB technique and diaphragmatic breathing. Encouraged to review in COPD book.    Signature Neena Ruelas RN

## 2025-07-29 ENCOUNTER — CLINICAL SUPPORT (OUTPATIENT)
Dept: CARDIAC REHAB | Facility: CLINIC | Age: 70
End: 2025-07-29
Payer: MEDICARE

## 2025-07-29 DIAGNOSIS — J44.9 CHRONIC OBSTRUCTIVE PULMONARY DISEASE, UNSPECIFIED COPD TYPE (MULTI): ICD-10-CM

## 2025-07-29 PROCEDURE — 94626 PHY/QHP OP PULM RHB W/MNTR: CPT | Performed by: INTERNAL MEDICINE

## 2025-07-29 NOTE — PROGRESS NOTES
Pulmonary Rehab Education  Marilou Albarado   [id number]    7/29/2025  Discussion of prescribed drug names, doses, side effects, and medication uses was done with patient. Patient's medication list was reviewed with patient and copy of current medication list was given to patient in addition to education handout, Medications Used for Pulmonary Conditions. Patient was instructed to come back with any questions.     7/29/25 Pt weight training 2 days a week at the [facility]. Discussed the importance of weight training for pulmonary disease.         Signature Neena Ruelas RN

## 2025-08-01 ENCOUNTER — APPOINTMENT (OUTPATIENT)
Dept: CARDIAC REHAB | Facility: CLINIC | Age: 70
End: 2025-08-01
Payer: MEDICARE

## 2025-08-04 ENCOUNTER — CLINICAL SUPPORT (OUTPATIENT)
Dept: CARDIAC REHAB | Facility: CLINIC | Age: 70
End: 2025-08-04
Payer: MEDICARE

## 2025-08-04 DIAGNOSIS — J44.9 CHRONIC OBSTRUCTIVE PULMONARY DISEASE, UNSPECIFIED COPD TYPE (MULTI): ICD-10-CM

## 2025-08-04 PROCEDURE — 94626 PHY/QHP OP PULM RHB W/MNTR: CPT | Performed by: INTERNAL MEDICINE

## 2025-08-05 ENCOUNTER — DOCUMENTATION (OUTPATIENT)
Dept: CARDIAC REHAB | Facility: CLINIC | Age: 70
End: 2025-08-05

## 2025-08-05 ENCOUNTER — CLINICAL SUPPORT (OUTPATIENT)
Dept: CARDIAC REHAB | Facility: CLINIC | Age: 70
End: 2025-08-05
Payer: MEDICARE

## 2025-08-05 DIAGNOSIS — F51.01 PRIMARY INSOMNIA: ICD-10-CM

## 2025-08-05 DIAGNOSIS — J44.9 CHRONIC OBSTRUCTIVE PULMONARY DISEASE, UNSPECIFIED COPD TYPE (MULTI): ICD-10-CM

## 2025-08-05 PROCEDURE — 94626 PHY/QHP OP PULM RHB W/MNTR: CPT | Performed by: INTERNAL MEDICINE

## 2025-08-05 RX ORDER — QUETIAPINE FUMARATE 300 MG/1
300 TABLET, FILM COATED ORAL NIGHTLY
Qty: 90 TABLET | Refills: 1 | Status: SHIPPED | OUTPATIENT
Start: 2025-08-05 | End: 2026-02-01

## 2025-08-05 NOTE — PROGRESS NOTES
Pulmonary Rehab Education  Marilou Albarado   [id number]    8/5/2025  Oxygen safety reviewed. Pt encouraged to refer to COPD book for reference.     Signature Neena Ruelas RN

## 2025-08-05 NOTE — PROGRESS NOTES
Pulmonary Rehabilitation 120 Day Progress Report    Name: Marilou Albarado  Medical Record Number: [id number]  YOB: 1955  Age: 69 y.o.    Today’s Date: 8/5/2025  Primary Care Physician: LIS Barnes-CNP  Referring Physician: Jenna Nieto, APR*/Perla Soriano MD  Program Location: [address]  Primary Diagnosis: COPD   Onset/Date of Diagnosis: approx 2018  Session #:  32  AACVPR Risk Stratification: moderate  Falls Risk: Low  Psychosocial Assessment  Pre PHQ-9: 6  Sent PH-Q 9 to MD if score > 20: No; score < 20    Stress Management    Pt reported/currently experiencing stress: Yes; Stress; Severity: severe  Patient uses stress management skills: No   History of: depression  Currently seeing a mental health provider: No  Social Support: Limited support, daughter in [place]  Pre CAT score: 15   Post CAT score: Survey to be given at discharge.   Learning Assessment:  Learning assessment/barriers: None  Preferred learning method: Visual, Reading handout, and Writing handout  Barriers: None  Comments:  Stages of Change: Action    Psychosocial Plan  Goal Status: In Progress  Psychosocial Goals: Demonstrating proper techniques for stress management, Maintain or lower PH-Q 9 score by discharge, Identify strategies for managing depression, and Identify social supports  Psychosocial Interventions/Education:   4/29/25 reviewed initial PHQ-9 score w/ pt at [facility]/   6/13/25 Encouraged pt to attend stress lecture. No issues voiced to staff/  6/27/25 Effects of stress on the body, risks of unmanaged stress and techniques to minimize anxieties were all discussed in today's session. Handout was given for reference with resources to  [facility] should patient need further assistance with stress management. Patient encouraged to notify staff if stressors increase and additional help is required./  8/5/25 No psycho social issues voiced to staff./  Oxygen  Assessment    SpO2 at rest: 97 %  SpO2 with exertion: 94 %    Oxygen Use    Supplemental O2 prescribed: Yes,   Liters at rest: 2-3 L  Liters with exertion: 2-3 L    SpO2 at rest: 97 %  SpO2 with exertion: 94 %    Using O2 as prescribed: Yes  DME: Shaila      Demonstrates appropriate knowledge of O2 use: Yes   Demonstrates appropriate knowledge of O2 safety: Yes     Home O2 System: Concentrator  Portable O2 System: Portable Tank    Hypoxia managed: Yes   Home Pulse Oximeter: No     Pre MMRC: 3  Post MMRC:     Oxygen Plan  Goal Status: In Progress  Oxygen Goals: Decrease MMRC score, Learn and use pursed lip breathing as needed, and Titrate supplemental O2 as needed to keep SpO2 at 88% or greater  Oxygen Education/Interventions:   5/20/2025  Education completed on Energy Conservation. Handout given for home review.Juan Ruelas RN  6/10/25 PLB technique noted./jose  6/27/2025  Gave  Living Well With COPD booklet and reviewed resources for pulmonary rehabilitation and exercise. Introduced pursed lip breathing and diaphragmatic breathing during exercise session. Reviewed the benefits of breathing exercise for oxygenation and control with exertion. Practiced with patient and she was able to review back. With booklet, reviewed COPD action plan as well with patient.  Juan Ruelas RN  7/28/2025  Discussed PLB technique and diaphragmatic breathing. Encouraged to review in COPD book.   Juan Ruelas RN   8/5/2025  Oxygen safety reviewed. Pt encouraged to refer to COPD book for reference. Juan Ruelas RN        Nutrition Assessment:    Hyperlipidemia: Yes     Lipids:   Lab Results   Component Value Date    CHOL 173 03/07/2025    HDL 47 (L) 03/07/2025    TRIG 243 (H) 03/07/2025       Current Dietary Guidelines: no special diet  Barriers to dietary change: no    Diet Habit Survey: Picture Your Plate  Pre: Survey not yet returned by patient.  Post: To be done at  "discharge.    Diabetes Assessment    Lab Results   Component Value Date    HGBA1C 6.6 (H) 03/07/2025       History of Diabetes: Yes Pt monitors BS at home: Yes   Frequency: 1 /day  FBS range: 116   Hypoglycemic Episodes: No     Weight Management    Height: 157.5 cm (5' 2\")  Weight: 202lb  BMI (Calculated): 37.49      Nutrition Plan    Goal Status: In Progress  Nutrition Goals: Improve Diet Habit Survey score by 5-10 points by discharge, Learn how to read and interpret nutrition labels prior to discharge, and Lose 1lb/week while enrolled in program  Nutrition Interventions/Education:   5/2/2025  Patient attended Heart Healthy Diet lecture with program RDN during today's exercise session. Heart Healthy diet tips sheet was given for further review at home and patient was encouraged to come back with any follow up questions/MK  5/13/2025  Education on cholesterol was done during today's session. Discussed with patient the different types of cholesterol, risk factors and how to make lifestyle modifications to improve levels. Handout and patient's recent lab results were given for home review. Juan Ruelas RN  5/30/2025  Education done on dining out. Dining out handout given for further review at home. Encouraged to follow up with questions as needed Juan Ruelas RN  6/13/2025  Education on portion sizes was done during today's session. Gave visual demonstration of portions. Handouts were given for home reference and review. Juan Ruelas RN  7/18/2025  Education on sodium intake was done. Discussed with patient the risks of excess levels of sodium and how to decrease dietary intake with provided list of substitutions. Handouts were given for home reference.    Juan Ruelas RN            Exercise Assessment    Home exercise:  No  Mode: NA  Frequency: NA  Duration: NA    6 Minute Walk Assessment     6 MWT distance:  1060 ft  FiO2 used during test: 3 " Liters    Exercise Prescription      Exercise Prescription based on: current ex rx       Frequency:  3 days/week   Mode: Treadmill, NuStep, and Recumbent Cycle   Duration: 30 total aerobic minutes   Intensity: RPE 12-14  Target HR:  RPE/RPD  MET Level: 2.4  Patient wears supplemental O2: Yes;   O2 Flow Rate: 2 to 3 L/min  SpO2 Range: 88 to 98 %     Modality Workload METs Duration (minutes)   1 Pre-Exercise      2 Treadmill 1.6 mph @ 1% 2.3 12 :00   3 NuStep Load 5  54 casiano  2.4 12 :00   4 Recumbent Bike Load 4 @ 55 rpm 1.8 12 :00   5 Schwinn Airdyne 0.8 Load  2.2 12 :00   6 Post-Exercise        Resistance Training: No   Home Exercise Prescription given: To be given prior to discharge from program.    Exercise Plan  Goal Status: In Progress  Exercise Goals: Increase exercise MET level by 5-10% each week, Increase total exercise duration to 30-45 minutes, Initiate strength training 2-3 days a week, and Establish a home exercise program before discharge  Exercise Interventions/Education:   5/2/25 Oriented to exercise card and exercise equipment. Reviewed how to read pulse oximeter.Signature Neena Ruelas RN  7/8/25 Discussed exercising at fitness center. Pt instructed to use continuous oxygen and monitor saturations on  pulse ox. Encouraged to keep saturations greater than 88% with exercise. Verbalized understanding./jose  7/15/2025  Instructed on upper body resistance training. Will review.   Signature Neena Ruelas RN  7/29/25 Pt weight training 2 days a week at the [facility]. Discussed the importance of weight training for pulmonary disease. /jose  Other Core Components/Risk Factor Assessment:    Medication adherence  Current Medications:   Medication Documentation Review Audit       Reviewed by Bishop Sanchez MD (Physician) on 05/22/25 at 0914      Medication Order Taking? Sig Documenting Provider Last Dose Status   albuterol 90 mcg/actuation inhaler [id number] Yes Inhale 2 puffs every 6 hours if needed  for wheezing. Historical Provider, MD  Active   atorvastatin (Lipitor) 80 mg tablet [id number] Yes Take 1 tablet (80 mg) by mouth once daily. JAIR Barnes  Active   Autolet lancing device [id number] Yes Use once daily to monitor blood sugars JAIR Barnes  Active   blood glucose control, normal solution [id number] Yes Glucose control solution provides an easy way to ensure accurate blood glucose testing. Use to calibrate monthly, once a day, with every new strip bottle JAIR Barnes  Active   Blood glucose monitoring meter [id number] Yes Test daily before all meals/snacks and once before bedtime. JAIR Barnes  Active   blood sugar diagnostic [id number] Yes 1 strip once daily. JAIR Barnes  Active   budesonide-formoterol (Symbicort) 80-4.5 mcg/actuation inhaler [id number] Yes Inhale 2 puffs 2 times a day. Rinse mouth with water after use to reduce aftertaste and incidence of candidiasis. Do not swallow. Historical Provider, MD  Active   ibandronate (Boniva) 150 mg tablet [id number] Yes Take 1 tablet (150 mg) by mouth every 30 (thirty) days. Take in morning with full glass of water on an empty stomach. No food, drink, meds, or lying down for 60 minutes after. JAIR Barnes  Active   isopropyl alcohoL 70 % towelette [id number] Yes Test daily before all meals/snacks and once before bedtime. JAIR Barnes  Active   lancets (Accu-Chek Softclix Lancets) misc [id number] Yes 1 each once daily. JAIR Barnes  Active   lancets 30 gauge misc [id number] Yes 1 Lancet once daily. Historical Provider, MD  Active   metoprolol succinate XL (Toprol-XL) 50 mg 24 hr tablet [id number] Yes Take 1 tablet (50 mg) by mouth early in the morning.. JAIR Barnes  Active   nitroglycerin (Nitrostat) 0.4 mg SL tablet [id number] Yes as directed Sublingual prn Historical Provider, MD  Active   QUEtiapine (SEROquel) 300 mg tablet  [id number] Yes Take 1 tablet (300 mg) by mouth once daily at bedtime. LIS Barnes-CNP  Active   semaglutide (Ozempic) 0.25 mg or 0.5 mg(2 mg/1.5 mL) pen injector [id number] Yes Inject 0.25 mg under the skin 1 (one) time per week. Historical Provider, MD  Active   sertraline (Zoloft) 100 mg tablet [id number] Yes Take 1 tablet (100 mg) by mouth once daily. JAIR Barnes  Active    Patient not taking:   Discontinued 05/22/25 0909                                 Medication compliance: Yes   Uses pill box/organizer: No    Carries medication list: No    Uses Inhalers appropriately: Yes    Blood Pressure Management  History of Hypertension: Yes   Medication Changes: No   Resting BP:  Visit Vitals  BP: WNL     Heart Failure Management  Hx of Heart Failure: No    Smoking/Tobacco Assessment  Tobacco Use History[1]    Other Core Component Plan  Goal Status: In Progress  Other Core Component Goals: Medication compliance and Achieve resting BP of < 130/80 by discharge  Other Core Component Interventions/Education:   5/9/2025  Blood pressure education was done. How to read measurement numbers, ways to decrease, and risks of not managing blood pressure were discussed. Handouts were given for home reference and patient was encouraged to return with any questions.    Signature Neena Ruelas RN  6/2/25 reviewed controlled coughing technique to help with sputum production/MS  7/8/2025  Infection prevention discussed. Handout given for home review.Juan Ruelas RN  7/29/2025  Discussion of prescribed drug names, doses, side effects, and medication uses was done with patient. Patient's medication list was reviewed with patient and copy of current medication list was given to patient in addition to education handout, Medications Used for Pulmonary Conditions. Patient was instructed to come back with any questions. /mk  Individual Patient Goals:    Weight loss goal 10 lbs before discharge-Met  Walk  15 minutes by discharge    Goal Status: In Progress    Staff Comments:  Ms Albarado has been adherent with her pulmonary rehab classes. Continues to utilized 3L N/C to keep oxygen saturations greater than 88%. Has a few sessions left before discharge                [1]   Social History  Tobacco Use   Smoking Status Former    Current packs/day: 0.00    Types: Cigarettes    Quit date:     Years since quittin.5    Passive exposure: Never   Smokeless Tobacco Never

## 2025-08-08 ENCOUNTER — CLINICAL SUPPORT (OUTPATIENT)
Dept: CARDIAC REHAB | Facility: CLINIC | Age: 70
End: 2025-08-08
Payer: MEDICARE

## 2025-08-08 DIAGNOSIS — J44.9 CHRONIC OBSTRUCTIVE PULMONARY DISEASE, UNSPECIFIED COPD TYPE (MULTI): ICD-10-CM

## 2025-08-08 PROCEDURE — 94626 PHY/QHP OP PULM RHB W/MNTR: CPT | Performed by: INTERNAL MEDICINE

## 2025-08-11 ENCOUNTER — CLINICAL SUPPORT (OUTPATIENT)
Dept: CARDIAC REHAB | Facility: CLINIC | Age: 70
End: 2025-08-11
Payer: MEDICARE

## 2025-08-11 DIAGNOSIS — J44.9 CHRONIC OBSTRUCTIVE PULMONARY DISEASE, UNSPECIFIED COPD TYPE (MULTI): ICD-10-CM

## 2025-08-11 DIAGNOSIS — J44.9 CHRONIC OBSTRUCTIVE PULMONARY DISEASE, UNSPECIFIED COPD TYPE (MULTI): Primary | ICD-10-CM

## 2025-08-11 PROCEDURE — 94626 PHY/QHP OP PULM RHB W/MNTR: CPT | Performed by: INTERNAL MEDICINE

## 2025-08-12 ENCOUNTER — APPOINTMENT (OUTPATIENT)
Dept: CARDIAC REHAB | Facility: CLINIC | Age: 70
End: 2025-08-12
Payer: MEDICARE

## 2025-08-15 ENCOUNTER — CLINICAL SUPPORT (OUTPATIENT)
Dept: CARDIAC REHAB | Facility: CLINIC | Age: 70
End: 2025-08-15
Payer: MEDICARE

## 2025-08-15 DIAGNOSIS — J44.9 CHRONIC OBSTRUCTIVE PULMONARY DISEASE, UNSPECIFIED COPD TYPE (MULTI): ICD-10-CM

## 2025-08-15 PROCEDURE — 94626 PHY/QHP OP PULM RHB W/MNTR: CPT | Performed by: INTERNAL MEDICINE

## 2025-08-18 ENCOUNTER — DOCUMENTATION (OUTPATIENT)
Dept: CARDIAC REHAB | Facility: CLINIC | Age: 70
End: 2025-08-18
Payer: MEDICARE

## 2025-08-19 ENCOUNTER — APPOINTMENT (OUTPATIENT)
Dept: PRIMARY CARE | Facility: CLINIC | Age: 70
End: 2025-08-19
Payer: MEDICARE

## 2025-08-19 ENCOUNTER — APPOINTMENT (OUTPATIENT)
Dept: NEUROLOGY | Facility: CLINIC | Age: 70
End: 2025-08-19
Payer: MEDICARE

## 2025-08-21 ENCOUNTER — OFFICE VISIT (OUTPATIENT)
Dept: PRIMARY CARE | Facility: CLINIC | Age: 70
End: 2025-08-21
Payer: MEDICARE

## 2025-08-21 VITALS
WEIGHT: 196.2 LBS | HEART RATE: 69 BPM | DIASTOLIC BLOOD PRESSURE: 78 MMHG | RESPIRATION RATE: 16 BRPM | BODY MASS INDEX: 36.1 KG/M2 | SYSTOLIC BLOOD PRESSURE: 123 MMHG | HEIGHT: 62 IN | OXYGEN SATURATION: 94 %

## 2025-08-21 DIAGNOSIS — E11.65 TYPE 2 DIABETES MELLITUS WITH HYPERGLYCEMIA, WITHOUT LONG-TERM CURRENT USE OF INSULIN: ICD-10-CM

## 2025-08-21 DIAGNOSIS — F51.01 PRIMARY INSOMNIA: ICD-10-CM

## 2025-08-21 DIAGNOSIS — Z12.31 ENCOUNTER FOR SCREENING MAMMOGRAM FOR MALIGNANT NEOPLASM OF BREAST: Primary | ICD-10-CM

## 2025-08-21 DIAGNOSIS — E78.2 MIXED HYPERLIPIDEMIA: ICD-10-CM

## 2025-08-21 DIAGNOSIS — F41.9 ANXIETY: ICD-10-CM

## 2025-08-21 DIAGNOSIS — I10 PRIMARY HYPERTENSION: ICD-10-CM

## 2025-08-21 DIAGNOSIS — J45.20 MILD INTERMITTENT ASTHMA WITHOUT COMPLICATION (HHS-HCC): ICD-10-CM

## 2025-08-21 LAB — POC HEMOGLOBIN A1C: 5.9 % (ref 4.2–6.5)

## 2025-08-21 PROCEDURE — 99214 OFFICE O/P EST MOD 30 MIN: CPT

## 2025-08-21 PROCEDURE — 3078F DIAST BP <80 MM HG: CPT

## 2025-08-21 PROCEDURE — 1126F AMNT PAIN NOTED NONE PRSNT: CPT

## 2025-08-21 PROCEDURE — 1159F MED LIST DOCD IN RCRD: CPT

## 2025-08-21 PROCEDURE — 3074F SYST BP LT 130 MM HG: CPT

## 2025-08-21 PROCEDURE — 3008F BODY MASS INDEX DOCD: CPT

## 2025-08-21 PROCEDURE — 83036 HEMOGLOBIN GLYCOSYLATED A1C: CPT | Mod: QW

## 2025-08-21 PROCEDURE — 3044F HG A1C LEVEL LT 7.0%: CPT

## 2025-08-21 RX ORDER — ATORVASTATIN CALCIUM 80 MG/1
80 TABLET, FILM COATED ORAL DAILY
Qty: 90 TABLET | Refills: 1 | Status: SHIPPED | OUTPATIENT
Start: 2025-08-21 | End: 2026-08-21

## 2025-08-21 RX ORDER — ALBUTEROL SULFATE 90 UG/1
2 INHALANT RESPIRATORY (INHALATION) EVERY 6 HOURS PRN
Qty: 18 G | Refills: 2 | Status: SHIPPED | OUTPATIENT
Start: 2025-08-21

## 2025-08-21 RX ORDER — METOPROLOL SUCCINATE 50 MG/1
50 TABLET, EXTENDED RELEASE ORAL
Qty: 90 TABLET | Refills: 1 | Status: SHIPPED | OUTPATIENT
Start: 2025-08-21 | End: 2026-08-21

## 2025-08-21 RX ORDER — QUETIAPINE FUMARATE 300 MG/1
300 TABLET, FILM COATED ORAL NIGHTLY
Qty: 90 TABLET | Refills: 1 | Status: SHIPPED | OUTPATIENT
Start: 2025-08-21 | End: 2026-02-17

## 2025-08-21 RX ORDER — SERTRALINE HYDROCHLORIDE 100 MG/1
100 TABLET, FILM COATED ORAL DAILY
Qty: 90 TABLET | Refills: 1 | Status: SHIPPED | OUTPATIENT
Start: 2025-08-21 | End: 2026-02-17

## 2025-08-21 ASSESSMENT — ENCOUNTER SYMPTOMS
LOSS OF SENSATION IN FEET: 0
DEPRESSION: 0
OCCASIONAL FEELINGS OF UNSTEADINESS: 0

## 2025-08-21 ASSESSMENT — PAIN SCALES - GENERAL: PAINLEVEL_OUTOF10: 0-NO PAIN

## 2025-08-22 LAB
ALBUMIN/CREAT UR: 8 MG/G CREAT
CREAT UR-MCNC: 133 MG/DL (ref 20–275)
MICROALBUMIN UR-MCNC: 1 MG/DL